# Patient Record
Sex: FEMALE | Race: WHITE | Employment: FULL TIME | URBAN - METROPOLITAN AREA
[De-identification: names, ages, dates, MRNs, and addresses within clinical notes are randomized per-mention and may not be internally consistent; named-entity substitution may affect disease eponyms.]

---

## 2017-12-13 ENCOUNTER — OFFICE VISIT (OUTPATIENT)
Dept: ORTHOPEDIC SURGERY | Age: 42
End: 2017-12-13

## 2017-12-13 VITALS
TEMPERATURE: 98.4 F | RESPIRATION RATE: 18 BRPM | SYSTOLIC BLOOD PRESSURE: 147 MMHG | WEIGHT: 249.4 LBS | BODY MASS INDEX: 35.71 KG/M2 | HEIGHT: 70 IN | OXYGEN SATURATION: 99 % | HEART RATE: 91 BPM | DIASTOLIC BLOOD PRESSURE: 72 MMHG

## 2017-12-13 DIAGNOSIS — M62.830 MUSCLE SPASM OF BACK: ICD-10-CM

## 2017-12-13 DIAGNOSIS — M47.816 LUMBAR FACET ARTHROPATHY: Primary | ICD-10-CM

## 2017-12-13 DIAGNOSIS — Z98.890 S/P LAMINECTOMY: ICD-10-CM

## 2017-12-13 DIAGNOSIS — G89.29 OTHER CHRONIC PAIN: ICD-10-CM

## 2017-12-13 RX ORDER — DULOXETIN HYDROCHLORIDE 60 MG/1
CAPSULE, DELAYED RELEASE ORAL
Refills: 0 | COMMUNITY
Start: 2017-11-28 | End: 2018-04-11 | Stop reason: ALTCHOICE

## 2017-12-13 RX ORDER — PHENTERMINE HYDROCHLORIDE 37.5 MG/1
TABLET ORAL
Refills: 0 | COMMUNITY
Start: 2017-10-08 | End: 2017-12-13 | Stop reason: ALTCHOICE

## 2017-12-13 RX ORDER — ESCITALOPRAM OXALATE 20 MG/1
20 TABLET ORAL DAILY
Refills: 0 | COMMUNITY
Start: 2017-11-22

## 2017-12-13 RX ORDER — ALPRAZOLAM 0.5 MG/1
0.5 TABLET ORAL
Refills: 0 | COMMUNITY
Start: 2017-11-30 | End: 2018-03-27 | Stop reason: ALTCHOICE

## 2017-12-13 NOTE — PROGRESS NOTES
[facility]  [address]aira Rodríguez[address] [place], [facility] [address]  Phone: [phone]  Fax: [phone]      ASSESSMENT   Diagnoses and all orders for this visit:    1. Lumbar facet arthropathy  -     SCHEDULE SURGERY    2. Muscle spasm of back  -     SCHEDULE SURGERY    3. Other chronic pain    4. S/P laminectomy         IMPRESSION AND PLAN:  Yanni Tomlin is a 38 y.o. female with history of lumbar pain. Pt reports that she experienced miserable lower back and buttock pain 2 weeks ago. She has been taking Aleve and Tylenol for pain and had a right L4-5 hemilaminotomy with Dr. Shazia Saenz on 03/09/2016. Pt has experienced significant relief with prednisone and steroid injections in the past.   More than 25 minutes was spent discussing pt's previous surgery, injections, medication including multiple NSAIDs, weight loss, exercise, RFA, and use of pain medication. 1) Pt was given information on lumbar arthritis exercises. 2) She was instructed to take no more than 2 tabs of Aleve daily. 3) Pt may try Voltaren 75 mg 1 tab BID in place of the Aleve. 4) I recommended the patient try gentle yoga. 5) I encouraged the patient try a low carb diet, She will discuss options with Dr. David Bernal prior to starting a diet. 6) I recommended the patient try Apsercaine. 7) She will discuss taking Cymbalta and Lexapro simultaneously with Dr. David Bernal. 8) Pt was scheduled for bilateral L4-5 and bilateral L5-S1 facet injection. 9) Ms. Felisa Mcghee has a reminder for a \"due or due soon\" health maintenance. I have asked that she contact her primary care provider, Vee Manley MD, for follow-up on this health maintenance. 10)  demonstrated consistency with prescribing.   11) Pt will follow-up in 6 weeks or sooner if needed. HISTORY OF PRESENT ILLNESS:  Yanni Tomlin is a 38 y.o. female with history of lumbar pain.  Pt reports that she experienced miserable lower back and buttock pain 2 weeks ago. She reports improvement and states that she is experiencing a \"good week. \" Her pain is worse when standing. Pt followed up with her PCP when she experienced no relief when taking 4 tabs Aleve and Tylenol for pain. She was told by her PCP that antiinflammatories are the best option. Pt has tried Naprosyn, Voltaren, and Relafen and reports the greatest relief with Voltaren. She experiences significant improvement when trying prednisone and steroid injections. Pt states that she has been followed by a rheumatologist and was tested for multiple types of arthritis with normal results. Pt complains of that she has gained weight and is unable to exercise due to the pain. 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 Of note, she had a right L4-5 hemilaminotomy with Dr. Stephen Conroy on 03/09/2016. Pt admits that her sister is a drug addict and states that she stole her last prescription of pain medication. She does not allow her sister in her house and had to lock up her purse over Thanksgiving so she would not steal her medications. Pt has been taking Percocet and reports that she had a reaction to sulfa when she was 21 y.o. She reports that she has taken drugs containing sulfa more recently without any side effects. Pt denies ever trying Celebrex and was placed on Cymbalta by Dr. Pedro Luis Gill. She admits that she is also taking Lexapro. Pt at this time desires to proceed with medication evaluation. Pain Scale: 3/10    PCP: Gal Frank MD       Past Medical History:   Diagnosis Date    Anger     Anxiety     Chronic pain     back pain    GERD (gastroesophageal reflux disease)     Psychiatric disorder     depression    Seizures (HCC)     Encephalitis, seizures only as a child, none since        Social History     Social History    Marital status:      Spouse name: N/A    Number of children: N/A    Years of education: N/A     Occupational History    Not on file.      Social History Main Topics    Smoking status: Current Every Day Smoker     Packs/day: 1.00     Years: 3.00     Last attempt to quit: 3/25/2005    Smokeless tobacco: Never Used    Alcohol use Yes      Comment: rarely    Drug use: No    Sexual activity: Not on file      Comment: Pregnancy test negative     Other Topics Concern    Not on file     Social History Narrative       Current Outpatient Prescriptions   Medication Sig Dispense Refill    ALPRAZolam (XANAX) 0.5 mg tablet Take 0.5 mg by mouth three (3) times daily as needed. 0    DULoxetine (CYMBALTA) 60 mg capsule take 1 capsule by mouth once daily  0    escitalopram oxalate (LEXAPRO) 20 mg tablet Take 20 mg by mouth daily. 0    oxyCODONE-acetaminophen (PERCOCET)  mg per tablet 1 po q pm prn severe pain 30 Tab 0    cyclobenzaprine (FLEXERIL) 10 mg tablet Take 1 Tab by mouth three (3) times daily as needed for Muscle Spasm(s). 60 Tab 0    Cholecalciferol, Vitamin D3, 1,000 unit cap Take  by mouth.  ascorbic acid (VITAMIN C) 500 mg tablet Take 500 mg by mouth daily.  naproxen sodium (ALEVE) 220 mg Cap Take 200 mg by mouth daily.  amitriptyline (ELAVIL) 10 mg tablet One po qhs x 1 week , then may increase to 2 tabs po qhs  Indications: NEUROPATHIC PAIN 60 Tab 1    pseudoephedrine (SUDAFED) 30 mg tablet Take 30 mg by mouth every four (4) hours as needed for Congestion. Allergies   Allergen Reactions    Betadine Surgi-Prep Rash    Neomycin Rash    Peroxyl [Hydrogen Peroxide] Rash    Sulfa Dyne Rash         REVIEW OF SYSTEMS    Constitutional: Negative for fever, chills, or weight change. Respiratory: Negative for cough or shortness of breath. Cardiovascular: Negative for chest pain or palpitations. Gastrointestinal: Negative for acid reflux, change in bowel habits, or constipation. Genitourinary: Negative for dysuria and flank pain. Musculoskeletal: Positive for lumbar pain. Skin: Negative for rash.    Neurological: Negative for headaches, dizziness, or numbness. Endo/Heme/Allergies: Negative for increased bruising. Psychiatric/Behavioral: Negative for difficulty with sleep. PHYSICAL EXAMINATION  Visit Vitals    /72    Pulse 91    Temp 98.4 °F (36.9 °C) (Oral)    Resp 18    Ht 5' 10\" (1.778 m)    Wt 249 lb 6.4 oz (113.1 kg)    SpO2 99%    BMI 35.79 kg/m2       Constitutional: Awake, alert, and in no acute distress. Neurological: 1+ symmetrical DTRs in the upper extremities. 1+ symmetrical DTRs in the lower extremities. Sensation to light touch is intact. Negative Tyson's sign bilaterally. Skin: warm, dry, and intact. Musculoskeletal: Tenderness to palpation in the lower lumbar region. No tenderness to palpation over the sacroiliac joints. Moderate pain with extension and axial loading. No pain with internal or external rotation of her hips. Negative straight leg raise bilaterally. Biceps  Triceps Deltoids Wrist Ext Wrist Flex Hand Intrin   Right +4/5 +4/5 +4/5 +4/5 +4/5 +4/5   Left +4/5 +4/5 +4/5 +4/5 +4/5 +4/5      Hip Flex  Quads Hamstrings Ankle DF EHL Ankle PF   Right +4/5 +4/5 +4/5 +4/5 +4/5 +4/5   Left +4/5 +4/5 +4/5 +4/5 +4/5 +4/5     IMAGING:    Lumbar spine MRI from 11/14/2015 was personally reviewed with the patient and demonstrated:    Results from [place] encounter on 11/14/15   MRI LUMB SPINE W WO CONT   Narrative Sagittal and axial multisequence MR images of lumbar spine were obtained without  and with 20 cc Magnevist gadolinium IV contrast.    History: Chronic back pain with now radiation to the right leg. Comparison: September 3, 2009    Straightening of lumbar lordosis with no significant spondylolisthesis. No  compression fracture or pathologic marrow signal. Conus medullaris ends at T12  and L1 with normal morphology and signal intensity. No abnormal enhancement. L1-L2: Mild posterior disc bulge. No central stenosis. No foraminal stenosis.     L2-L3: Also mild posterior disc bulge with no central stenosis. Mild facet  hypertrophy with small left facet joint effusion. No foraminal stenosis. Perineural cyst in the foramina. L3-L4: Posterior disc bulge with tiny central annular tear, flattening anterior  thecal sac with no central stenosis. Facet and ligamentous hypertrophy with no  foraminal stenosis. L4-L5: Developing central and right paracentral disc protrusion, displacing the  right L5 nerve root within the thecal sac. Facet ligamentous hypertrophy. Mild  central stenosis with canal measuring 9 mm. No foraminal stenosis or exiting L4  nerve root compression. L5-S1: Posterior disc bulge with small central disc protrusion. No central  stenosis. No compression or displacement of the S1 roots. Left laminotomy. No  arachnoiditis or significant thecal sac distortion. Mild facet hypertrophy. No  foraminal stenosis or exiting L5 root compression. Impression Impression:  1. Newly developed central and right paracentral disc protrusion at L4-L5, with  posterior displacement and compression of the right descending L5 nerve root  within the thecal sac. Facet hypertrophy with foraminal narrowing but no exiting  L4 root compression. 2. Additional findings as above. Written by Graham Muro, as dictated by Lynn Collins MD.  I, Dr. Lynn Collins confirm that all documentation is accurate.

## 2017-12-13 NOTE — MR AVS SNAPSHOT
Visit Information Date & Time Provider Department Dept. Phone Encounter #  
 12/13/2017  9:00 AM Sasha Evans, [facility] MAST  [id number] Follow-up Instructions Return in about 6 weeks (around 1/24/2018) for Injection follow up. Your Appointments 1/24/2018 10:30 AM  
Follow Up with Sasha Evans MD  
[facility] ONE [facility]) Appt Note: 6wk BLOCK FU; Lenoria Pointer 1/3/18  
 [address] [facility] [address]  
[phone]  
  
   
 [address] [facility] [address] Upcoming Health Maintenance Date Due Pneumococcal 19-64 Medium Risk (1 of 1 - PPSV23) 7/9/1992 DTaP/Tdap/Td series (1 - Tdap) 7/9/1994 PAP AKA CERVICAL CYTOLOGY 7/9/1994 Influenza Age 5 to Adult 8/1/2017 Allergies as of 12/13/2017  Review Complete On: 12/13/2017 By: Sasha Evans MD  
  
 Severity Noted Reaction Type Reactions Betadine Surgi-prep  09/11/2012    Rash Neomycin  09/11/2012    Rash Peroxyl [Hydrogen Peroxide]  09/11/2012    Rash  
 Sulfa Dyne  09/11/2012    Rash Current Immunizations  Never Reviewed No immunizations on file. Not reviewed this visit You Were Diagnosed With   
  
 Codes Comments Lumbar facet arthropathy    -  Primary ICD-10-CM: M12.88 ICD-9-CM: 721.3 Muscle spasm of back     ICD-10-CM: F89.140 ICD-9-CM: 724.8 Other chronic pain     ICD-10-CM: G89.29 ICD-9-CM: 338.29 S/P laminectomy     ICD-10-CM: R99.067 ICD-9-CM: V45.89 Vitals BP Pulse Temp Resp Height(growth percentile) Weight(growth percentile) 147/72 91 98.4 °F (36.9 °C) (Oral) 18 5' 10\" (1.778 m) 249 lb 6.4 oz (113.1 kg) SpO2 BMI OB Status Smoking Status 99% 35.79 kg/m2 IUD Current Every Day Smoker BMI and BSA Data Body Mass Index Body Surface Area  35.79 kg/m 2 2.36 m 2  
  
  
 Preferred Pharmacy Pharmacy Name Phone [facility] [address] [place] [address], [facility] [place] [address] [phone] Your Updated Medication List  
  
   
This list is accurate as of: 12/13/17 10:10 AM.  Always use your most recent med list.  
  
  
  
  
 ALEVE 220 mg Cap Generic drug:  naproxen sodium Take 200 mg by mouth daily. ALPRAZolam 0.5 mg tablet Commonly known as:  Mosetta Harp Take 0.5 mg by mouth three (3) times daily as needed. amitriptyline 10 mg tablet Commonly known as:  ELAVIL One po qhs x 1 week , then may increase to 2 tabs po qhs  Indications: NEUROPATHIC PAIN  
  
 cholecalciferol 1,000 unit Cap Commonly known as:  VITAMIN D3 Take  by mouth. cyclobenzaprine 10 mg tablet Commonly known as:  FLEXERIL Take 1 Tab by mouth three (3) times daily as needed for Muscle Spasm(s). DULoxetine 60 mg capsule Commonly known as:  CYMBALTA  
take 1 capsule by mouth once daily  
  
 escitalopram oxalate 20 mg tablet Commonly known as:  Shan Barrs Take 20 mg by mouth daily. oxyCODONE-acetaminophen  mg per tablet Commonly known as:  PERCOCET 1 po q pm prn severe pain  
  
 pseudoephedrine 30 mg tablet Commonly known as:  SUDAFED Take 30 mg by mouth every four (4) hours as needed for Congestion. VITAMIN C 500 mg tablet Generic drug:  ascorbic acid (vitamin C) Take 500 mg by mouth daily. We Performed the Following SCHEDULE SURGERY [LLY7345 Custom] Follow-up Instructions Return in about 6 weeks (around 1/24/2018) for Injection follow up. To-Do List   
 12/20/2017 4:15 PM  
  Appointment with KAILYN DILLON 2 at [address] [place] [address] ([phone]) OUTSIDE FILMS  - Any outside films related to the study being scheduled should be brought with you on the day of the exam.  If this cannot be done there may be a delay in the reading of the study.   MEDICATIONS  - Patient must bring a complete list of all medications currently taking to include prescriptions, over-the-counter meds, herbals, vitamins & any dietary supplements  GENERAL INSTRUCTIONS  - On the day of your exam do not use any bath powder, deodorant or lotions on the armpit area. -Tenderness of breasts may cause an increase of discomfort during procedure. If you are experiencing breast tenderness on the day of your appointment and would like to reschedule, please call [phone]. Patient Instructions Low Back Arthritis: Exercises Your Care Instructions Here are some examples of typical rehabilitation exercises for your condition. Start each exercise slowly. Ease off the exercise if you start to have pain. Your doctor or physical therapist will tell you when you can start these exercises and which ones will work best for you. When you are not being active, find a comfortable position for rest. Some people are comfortable on the floor or a medium-firm bed with a small pillow under their head and another under their knees. Some people prefer to lie on their side with a pillow between their knees. Don't stay in one position for too long. Take short walks (10 to 20 minutes) every 2 to 3 hours. Avoid slopes, hills, and stairs until you feel better. Walk only distances you can manage without pain, especially leg pain. How to do the exercises Pelvic tilt 1. Lie on your back with your knees bent. 2. \"Brace\" your stomach-tighten your muscles by pulling in and imagining your belly button moving toward your spine. 3. Press your lower back into the floor. You should feel your hips and pelvis rock back. 4. Hold for 6 seconds while breathing smoothly. 5. Relax and allow your pelvis and hips to rock forward. 6. Repeat 8 to 12 times. Back stretches 1. Get down on your hands and knees on the floor. 2. Relax your head and allow it to droop.  Round your back up toward the ceiling until you feel a nice stretch in your upper, middle, and lower back. Hold this stretch for as long as it feels comfortable, or about 15 to 30 seconds. 3. Return to the starting position with a flat back while you are on your hands and knees. 4. Let your back sway by pressing your stomach toward the floor. Lift your buttocks toward the ceiling. 5. Hold this position for 15 to 30 seconds. 6. Repeat 2 to 4 times. Follow-up care is a key part of your treatment and safety. Be sure to make and go to all appointments, and call your doctor if you are having problems. It's also a good idea to know your test results and keep a list of the medicines you take. Where can you learn more? Go to http://mona-rajni.info/. Enter G620 in the search box to learn more about \"Low Back Arthritis: Exercises. \" Current as of: March 21, 2017 Content Version: 11.4 © 8292-0552 [employer]. Care instructions adapted under license by [employer] (which disclaims liability or warranty for this information). If you have questions about a medical condition or this instruction, always ask your healthcare professional. [address] any warranty or liability for your use of this information. Introducing [facility]! Aravind Hauser introduces [employer] patient portal. Now you can access parts of your medical record, email your doctor's office, and request medication refills online. 1. In your internet browser, go to [email or web address] 2. Click on the First Time User? Click Here link in the Sign In box. You will see the New Member Sign Up page. 3. Enter your [employer] Access Code exactly as it appears below. You will not need to use this code after youve completed the sign-up process. If you do not sign up before the expiration date, you must request a new code. · [employer] Access Code: [email or web address] Expires: 2/26/2018  8:38 AM 
 
 4. Enter the last four digits of your Social Security Number (xxxx) and Date of Birth (mm/dd/yyyy) as indicated and click Submit. You will be taken to the next sign-up page. 5. Create a [employer] ID. This will be your [employer] login ID and cannot be changed, so think of one that is secure and easy to remember. 6. Create a [employer] password. You can change your password at any time. 7. Enter your Password Reset Question and Answer. This can be used at a later time if you forget your password. 8. Enter your e-mail address. You will receive e-mail notification when new information is available in [address] 9. Click Sign Up. You can now view and download portions of your medical record. 10. Click the Download Summary menu link to download a portable copy of your medical information. If you have questions, please visit the Frequently Asked Questions section of the [employer] website. Remember, [employer] is NOT to be used for urgent needs. For medical emergencies, dial 911. Now available from your iPhone and Android! Please provide this summary of care documentation to your next provider. Your primary care clinician is listed as Melanie Sharma. If you have any questions after today's visit, please call [phone].

## 2017-12-13 NOTE — PATIENT INSTRUCTIONS

## 2017-12-13 NOTE — LETTER
[facility] for [facility] Uriah Hester Fax to [phone]  Telephone: [phone] To be completed by Physician Office: 
 
Date: 2017    Requested by: Tania Ramirez Phone No: [phone]   Fax No:  [phone] Surgery Date: 2018  Arrival Time: 12:00   Injection Time: 1:30 Provider: Dea Allen     
 
CPT CODE*  Procedure 41094,51 FACET JOINT ALISTAIR L4/5  
30893,92 FACET JOINT ALISTAIR L5/S1 *CPT code is required for ALL procedures. Patient Information: 
 
Name: [facility]: [id number]  : 1975   Male/Female: female Home Phone No: [phone] (home) [phone] (work) Primary Insurance: [employer] Number: [id number] AUTH#                             SUBMITTED 17 ICD10 code(s): M12.88; M62.830    Diagnosis:  LUMBAR FACET ARTHROPATHY; MUSCLE SPASM OF BACK Diabetic/finger stick to be done BS level must be <200 mg/dl Anesthesia Type Local: Valium 10 mg PO 30 minutes prior to procedure

## 2017-12-19 ENCOUNTER — TELEPHONE (OUTPATIENT)
Dept: ORTHOPEDIC SURGERY | Age: 42
End: 2017-12-19

## 2017-12-19 NOTE — TELEPHONE ENCOUNTER
Per the note,  Pt may try Voltaren 75 mg 1 tab BID in place of the Aleve. Ok to send this #60, 0 RF. Please make sure patient knows to take this in placed of the Aleve.

## 2017-12-19 NOTE — TELEPHONE ENCOUNTER
Patient stated that Dr. Keven Patiño was suppose to put in a prescription for that patient since her last visit 12/13/17. Patient cant remember thet name of the prescription.  Please advise patient at [phone] (work) before 2 after 2 [phone](home)

## 2017-12-20 ENCOUNTER — HOSPITAL ENCOUNTER (OUTPATIENT)
Dept: MAMMOGRAPHY | Age: 42
Discharge: HOME | End: 2017-12-20
Attending: INTERNAL MEDICINE
Payer: COMMERCIAL

## 2017-12-20 DIAGNOSIS — Z12.31 VISIT FOR SCREENING MAMMOGRAM: ICD-10-CM

## 2017-12-20 PROCEDURE — 77067 SCR MAMMO BI INCL CAD: CPT

## 2017-12-20 RX ORDER — DICLOFENAC SODIUM 75 MG/1
75 TABLET, DELAYED RELEASE ORAL 2 TIMES DAILY WITH MEALS
Qty: 60 TAB | Refills: 0 | Status: SHIPPED | OUTPATIENT
Start: 2017-12-20 | End: 2018-01-24 | Stop reason: SDUPTHER

## 2017-12-20 RX ORDER — DICLOFENAC SODIUM 75 MG/1
75 TABLET, DELAYED RELEASE ORAL 2 TIMES DAILY WITH MEALS
Qty: 60 TAB | Refills: 0 | Status: SHIPPED | OUTPATIENT
Start: 2017-12-20 | End: 2018-03-21 | Stop reason: SDUPTHER

## 2017-12-20 NOTE — TELEPHONE ENCOUNTER
Rx for Diclofenac called In and patient was notified and to take this in place of the Aleve. Patient verbalized understanding . No further action needed.

## 2018-01-03 ENCOUNTER — APPOINTMENT (OUTPATIENT)
Dept: GENERAL RADIOLOGY | Age: 43
End: 2018-01-03
Attending: PHYSICAL MEDICINE & REHABILITATION
Payer: COMMERCIAL

## 2018-01-03 ENCOUNTER — HOSPITAL ENCOUNTER (OUTPATIENT)
Age: 43
Setting detail: OUTPATIENT SURGERY
Discharge: HOME | End: 2018-01-03
Attending: PHYSICAL MEDICINE & REHABILITATION | Admitting: PHYSICAL MEDICINE & REHABILITATION
Payer: COMMERCIAL

## 2018-01-03 VITALS
BODY MASS INDEX: 35.65 KG/M2 | DIASTOLIC BLOOD PRESSURE: 69 MMHG | RESPIRATION RATE: 20 BRPM | OXYGEN SATURATION: 99 % | WEIGHT: 249 LBS | HEART RATE: 71 BPM | TEMPERATURE: 98.5 F | HEIGHT: 70 IN | SYSTOLIC BLOOD PRESSURE: 137 MMHG

## 2018-01-03 PROCEDURE — 74011636320 HC RX REV CODE- 636/320: Performed by: PHYSICAL MEDICINE & REHABILITATION

## 2018-01-03 PROCEDURE — 74011250636 HC RX REV CODE- 250/636

## 2018-01-03 PROCEDURE — 74011000250 HC RX REV CODE- 250

## 2018-01-03 PROCEDURE — 74011250636 HC RX REV CODE- 250/636: Performed by: PHYSICAL MEDICINE & REHABILITATION

## 2018-01-03 PROCEDURE — 76010000009 HC PAIN MGT 0 TO 30 MIN PROC: Performed by: PHYSICAL MEDICINE & REHABILITATION

## 2018-01-03 RX ORDER — DIAZEPAM 5 MG/1
5-20 TABLET ORAL ONCE
Status: DISCONTINUED | OUTPATIENT
Start: 2018-01-03 | End: 2018-01-03 | Stop reason: HOSPADM

## 2018-01-03 RX ORDER — LIDOCAINE HYDROCHLORIDE 10 MG/ML
INJECTION, SOLUTION EPIDURAL; INFILTRATION; INTRACAUDAL; PERINEURAL AS NEEDED
Status: DISCONTINUED | OUTPATIENT
Start: 2018-01-03 | End: 2018-01-03 | Stop reason: HOSPADM

## 2018-01-03 RX ORDER — DEXAMETHASONE SODIUM PHOSPHATE 100 MG/10ML
INJECTION INTRAMUSCULAR; INTRAVENOUS AS NEEDED
Status: DISCONTINUED | OUTPATIENT
Start: 2018-01-03 | End: 2018-01-03 | Stop reason: HOSPADM

## 2018-01-03 RX ORDER — BUPROPION HYDROCHLORIDE 300 MG/1
300 TABLET ORAL
COMMUNITY

## 2018-01-03 NOTE — PROCEDURES
Facet Joint Block Procedure Note    Patient Name: Hakan Najera    Date of Procedure: January 3, 2018    Preoperative Diagnosis: Facet arthropathy, lumbar [M12.88]  Back muscle spasm [M62.830]    Post Operative Diagnosis:Facet arthropathy, lumbar [M12.88]  Back muscle spasm [M62.830]     Procedure:  bilateral  L5-S1 Facet Joint Block  bilateral  L4-L5 Facet Joint Block    Consent: Informed consent was obtained prior to the procedure. The patient was given the opportunity to ask questions regarding the procedure and its associated risks. In addition to the potential risks associated with the procedure itself, the patient was informed both verbally and in writing of potential side effects of the use of glucocorticoids. The patient appeared to comprehend the informed consent and desired to have the procedure perfored. Procedure: The patient was placed in the prone position on the flouroscopy table and the back was prepped and draped in the usual sterile manner. At each blocked level, the exact location of the facet joint was identified with flouroscopy, and after local Lidocaine 1% injection, a #22 gauge spinal needle was then advanced toward the joint. A total of 30 mg of preservative free Dexamethasone and 5  Facet Joint Block Procedure Note    Patient Name: Hakan Najera    Date of Procedure: January 3, 2018    Preoperative Diagnosis: Facet arthropathy, lumbar [M12.88]  Back muscle spasm [M62.830]    Post Operative Diagnosis:Facet arthropathy, lumbar [M12.88]  Back muscle spasm [M62.830]     Procedure:  bilateral  L4-L5 Facet Joint Block  bilateral  L5-S1 Facet Joint Block      Consent: Informed consent was obtained prior to the procedure. The patient was given the opportunity to ask questions regarding the procedure and its associated risks.  In addition to the potential risks associated with the procedure itself, the patient was informed both verbally and in writing of potential side effects of the use of glucocorticoids. The patient appeared to comprehend the informed consent and desired to have the procedure performed. Procedure: The patient was placed in the prone position on the flouroscopy table and the back was prepped and draped in the usual sterile manner. At each blocked level, the exact location of the facet joint was identified with flouroscopy, and after local Lidocaine 1% injection, a #22 gauge spinal needle was then advanced toward the joint. A total of 30 mg of preservative free Dexamethasone and 5 cc of Lidocaine was injected around and equally divided among all of the sites. The patient tolerated the procedure well. The injection area was cleaned and bandaids applied. No excessive bleeding was noted. Patient dressed and was discharged to home with instructions. Naomi Camacho MD  January 3, 2018 cc of Lidocaine was injected around and equally divided among all of the sites. The patient tolerated the procedure well. The injection area was cleaned and bandaids applied. No excessive bleeding was noted. Patient dressed and was discharged to home with instructions. Naomi Camacho MD  January 3, 2018     This is an addendum to state that one set of injections was performed -- bilateral L4/5, bilateral L5/S1 facet injections were performed; The note was entered twice incorrectly but only one set of injections was done.

## 2018-01-03 NOTE — H&P
Date of Surgery Update:  Charles Herr was seen and examined. History and physical has been reviewed. The patient has been examined. There have been no significant clinical changes since the completion of the last office visit.       Signed By: Duane Ensign, MD     January 3, 2018 1:51 PM

## 2018-01-03 NOTE — DISCHARGE INSTRUCTIONS
[facility]   ([facility])  Dr. April Thao, Dr. Jaime Lopes, Dr. Ofelia Ruffin not drive a car, operate heavy machinery or dangerous equipment for 24 hours. * Activity as tolerated; rest for the remainder of the day. * Resume pre-block medications including those for your family doctor. * Do not drink alcoholic beverages for 24 hours. Alcohol and the medications you have received may interact and cause an adverse reaction. * You may feel better this evening and worse tomorrow, as the numbing medications wears off and the steroid has yet to begin to work. After 48 hrs the steroid should begin to release bringing you relief. * You may shower this evening and remove any bandages. * Avoid hot tubs and heating pads for 24 hours. You may use cold packs on the procedure site as tolerated for the first 24 hours. * If a headache develops, drink plenty of fluids and rest.  Take over the counter medications for headache if needed. If the headache continues longer than 24 hours, call MD at the [address] [place] [address]. [phone]    * Continue taking pain medications as needed. * You may resume your regular diet if tolerated. Otherwise, start with sips of water and advance slowly. * If Diabetic: check your blood sugar three times a day for the next 3 days. If your sugar is greater than 300 call your family doctor. If your sugar is greater than 400, have someone transport you to the nearest Emergency Room. * If you experience any of the following problems, Please Call the [address] [place] [address] at [phone].         * Shortness of Breath    * Fever of 101 or higher    * Nausea / Vomiting    * Severe Headache    * Weakness or numbness in arms or legs that is not      resolving    * Prolonged increase in pain greater than 4 days      DISCHARGE SUMMARY from Nurse      PATIENT INSTRUCTIONS:    After oral sedation, for 24 hours or while taking prescription Narcotics:  · Limit your activities  · Do not drive and operate hazardous machinery  · Do not make important personal or business decisions  · Do  not drink alcoholic beverages  · If you have not urinated within 8 hours after discharge, please contact your surgeon on call. Report the following to your surgeon:  · Excessive pain, swelling, redness or odor of or around the surgical area  · Temperature over 101  · Nausea and vomiting lasting longer than 4 hours or if unable to take medications  · Any signs of decreased circulation or nerve impairment to extremity: change in color, persistent  numbness, tingling, coldness or increase pain  · Any questions            What to do at Home:  Recommended activity: Activity as tolerated, NO DRIVING FOR 12 Hours post injection          *  Please give a list of your current medications to your Primary Care Provider. *  Please update this list whenever your medications are discontinued, doses are      changed, or new medications (including over-the-counter products) are added. *  Please carry medication information at all times in case of emergency situations. These are general instructions for a healthy lifestyle:    No smoking/ No tobacco products/ Avoid exposure to second hand smoke    Surgeon General's Warning:  Quitting smoking now greatly reduces serious risk to your health. Obesity, smoking, and sedentary lifestyle greatly increases your risk for illness    A healthy diet, regular physical exercise & weight monitoring are important for maintaining a healthy lifestyle    You may be retaining fluid if you have a history of heart failure or if you experience any of the following symptoms:  Weight gain of 3 pounds or more overnight or 5 pounds in a week, increased swelling in our hands or feet or shortness of breath while lying flat in bed.   Please call your doctor as soon as you notice any of these symptoms; do not wait until your next office visit. Recognize signs and symptoms of STROKE:    F-face looks uneven    A-arms unable to move or move unevenly    S-speech slurred or non-existent    T-time-call 911 as soon as signs and symptoms begin-DO NOT go       Back to bed or wait to see if you get better-TIME IS BRAIN. MyChart Activation    Thank you for requesting access to [employer]. Please follow the instructions below to securely access and download your online medical record. Vocation allows you to send messages to your doctor, view your test results, renew your prescriptions, schedule appointments, and more. How Do I Sign Up? 1. In your internet browser, go to [email or web address]  2. Click on the First Time User? Click Here link in the Sign In box. You will be redirect to the New Member Sign Up page. 3. Enter your [employer] Access Code exactly as it appears below. You will not need to use this code after youve completed the sign-up process. If you do not sign up before the expiration date, you must request a new code. V[employer] Access Code: [email or web address]  Expires: 2018  8:38 AM (This is the date your Vocation access code will )    4. Enter the last four digits of your Social Security Number (xxxx) and Date of Birth (mm/dd/yyyy) as indicated and click Submit. You will be taken to the next sign-up page. 5. Create a Vocation ID. This will be your Vocation login ID and cannot be changed, so think of one that is secure and easy to remember. 6. Create a Vocation password. You can change your password at any time. 7. Enter your Password Reset Question and Answer. This can be used at a later time if you forget your password. 8. Enter your e-mail address. You will receive e-mail notification when new information is available in [address] 9. Click Sign Up. You can now view and download portions of your medical record.   10. Click the Download Summary menu link to download a portable copy of your medical information. Additional Information    If you have questions, please visit the Frequently Asked Questions section of the [employer] website at [email or web address] Remember, [employer] is NOT to be used for urgent needs. For medical emergencies, dial 911.

## 2018-01-24 ENCOUNTER — OFFICE VISIT (OUTPATIENT)
Dept: ORTHOPEDIC SURGERY | Age: 43
End: 2018-01-24

## 2018-01-24 VITALS
HEIGHT: 70 IN | TEMPERATURE: 98.3 F | BODY MASS INDEX: 36.31 KG/M2 | SYSTOLIC BLOOD PRESSURE: 138 MMHG | OXYGEN SATURATION: 99 % | DIASTOLIC BLOOD PRESSURE: 70 MMHG | WEIGHT: 253.6 LBS | RESPIRATION RATE: 18 BRPM | HEART RATE: 76 BPM

## 2018-01-24 DIAGNOSIS — M47.816 FACET ARTHRITIS OF LUMBAR REGION: Primary | ICD-10-CM

## 2018-01-24 DIAGNOSIS — M62.830 MUSCLE SPASM OF BACK: ICD-10-CM

## 2018-01-24 DIAGNOSIS — M51.36 DDD (DEGENERATIVE DISC DISEASE), LUMBAR: ICD-10-CM

## 2018-01-24 RX ORDER — DICLOFENAC SODIUM 75 MG/1
75 TABLET, DELAYED RELEASE ORAL 2 TIMES DAILY WITH MEALS
Qty: 180 TAB | Refills: 1 | Status: SHIPPED | OUTPATIENT
Start: 2018-01-24

## 2018-01-24 NOTE — MR AVS SNAPSHOT
[address] [place] [address] [address] [place] [address] 
[phone] Patient: Yandel Beavers 
MRN: [id number] Visit Information Date & Time Provider Department Dept. Phone Encounter #  
 1/24/2018 10:30 AM Janes Barroso MD [facility] [phone] [id number] Follow-up Instructions Return in about 2 months (around 3/24/2018) for follow up. Upcoming Health Maintenance Date Due Pneumococcal 19-64 Medium Risk (1 of 1 - PPSV23) 7/9/1992 DTaP/Tdap/Td series (1 - Tdap) 7/9/1994 PAP AKA CERVICAL CYTOLOGY 7/9/1994 Influenza Age 5 to Adult 8/1/2017 Allergies as of 1/24/2018  Review Complete On: 1/24/2018 By: Janes Barroso MD  
  
 Severity Noted Reaction Type Reactions Betadine Surgi-prep  09/11/2012    Rash Neomycin  09/11/2012    Rash Peroxyl [Hydrogen Peroxide]  09/11/2012    Rash  
 Sulfa Dyne  09/11/2012    Rash Current Immunizations  Never Reviewed No immunizations on file. Not reviewed this visit You Were Diagnosed With   
  
 Codes Comments Facet arthritis of lumbar region [facility])    -  Primary ICD-10-CM: M46.96 
ICD-9-CM: 721.3 Muscle spasm of back     ICD-10-CM: V43.512 ICD-9-CM: 724.8   
 DDD (degenerative disc disease), lumbar     ICD-10-CM: M51.36 
ICD-9-CM: 722.52 Vitals BP Pulse Temp Resp Height(growth percentile) Weight(growth percentile) 138/70 76 98.3 °F (36.8 °C) (Oral) 18 5' 10\" (1.778 m) 253 lb 9.6 oz (115 kg) SpO2 BMI OB Status Smoking Status 99% 36.39 kg/m2 IUD Current Every Day Smoker BMI and BSA Data Body Mass Index Body Surface Area  
 36.39 kg/m 2 2.38 m 2 Preferred Pharmacy Pharmacy Name Phone [facility] [address] [place] [address] [phone] Your Updated Medication List  
  
   
 This list is accurate as of: 1/24/18 11:17 AM.  Always use your most recent med list.  
  
  
  
  
 ALEVE 220 mg Cap Generic drug:  naproxen sodium Take 200 mg by mouth daily. ALPRAZolam 0.5 mg tablet Commonly known as:  [place] Take 0.5 mg by mouth three (3) times daily as needed. amitriptyline 10 mg tablet Commonly known as:  ELAVIL One po qhs x 1 week , then may increase to 2 tabs po qhs  Indications: NEUROPATHIC PAIN  
  
 cholecalciferol 1,000 unit Cap Commonly known as:  VITAMIN D3 Take  by mouth. cyclobenzaprine 10 mg tablet Commonly known as:  FLEXERIL Take 1 Tab by mouth three (3) times daily as needed for Muscle Spasm(s). * diclofenac EC 75 mg EC tablet Commonly known as:  VOLTAREN Take 1 Tab by mouth two (2) times daily (with meals). * diclofenac EC 75 mg EC tablet Commonly known as:  VOLTAREN Take 1 Tab by mouth two (2) times daily (with meals). DULoxetine 60 mg capsule Commonly known as:  CYMBALTA  
take 1 capsule by mouth once daily  
  
 escitalopram oxalate 20 mg tablet Commonly known as:  Ambika Code Take 20 mg by mouth daily. oxyCODONE-acetaminophen  mg per tablet Commonly known as:  PERCOCET 1 po q pm prn severe pain  
  
 pseudoephedrine 30 mg tablet Commonly known as:  SUDAFED Take 30 mg by mouth every four (4) hours as needed for Congestion. VITAMIN C 500 mg tablet Generic drug:  ascorbic acid (vitamin C) Take 500 mg by mouth daily. WELLBUTRIN  mg XL tablet Generic drug:  buPROPion XL Take 300 mg by mouth every morning. * Notice: This list has 2 medication(s) that are the same as other medications prescribed for you. Read the directions carefully, and ask your doctor or other care provider to review them with you. Prescriptions Sent to Pharmacy Refills  
 diclofenac EC (VOLTAREN) 75 mg EC tablet 1 Sig: Take 1 Tab by mouth two (2) times daily (with meals). Class: Normal  
 Pharmacy: [id number] [address] #: [phone] Route: Oral  
  
Follow-up Instructions Return in about 2 months (around 3/24/2018) for follow up. Patient Instructions Low Back Arthritis: Exercises Your Care Instructions Here are some examples of typical rehabilitation exercises for your condition. Start each exercise slowly. Ease off the exercise if you start to have pain. Your doctor or physical therapist will tell you when you can start these exercises and which ones will work best for you. When you are not being active, find a comfortable position for rest. Some people are comfortable on the floor or a medium-firm bed with a small pillow under their head and another under their knees. Some people prefer to lie on their side with a pillow between their knees. Don't stay in one position for too long. Take short walks (10 to 20 minutes) every 2 to 3 hours. Avoid slopes, hills, and stairs until you feel better. Walk only distances you can manage without pain, especially leg pain. How to do the exercises Pelvic tilt 1. Lie on your back with your knees bent. 2. \"Brace\" your stomach-tighten your muscles by pulling in and imagining your belly button moving toward your spine. 3. Press your lower back into the floor. You should feel your hips and pelvis rock back. 4. Hold for 6 seconds while breathing smoothly. 5. Relax and allow your pelvis and hips to rock forward. 6. Repeat 8 to 12 times. Back stretches 1. Get down on your hands and knees on the floor. 2. Relax your head and allow it to droop. Round your back up toward the ceiling until you feel a nice stretch in your upper, middle, and lower back. Hold this stretch for as long as it feels comfortable, or about 15 to 30 seconds. 3. Return to the starting position with a flat back while you are on your hands and knees. 4. Let your back sway by pressing your stomach toward the floor. Lift your buttocks toward the ceiling. 5. Hold this position for 15 to 30 seconds. 6. Repeat 2 to 4 times. Follow-up care is a key part of your treatment and safety. Be sure to make and go to all appointments, and call your doctor if you are having problems. It's also a good idea to know your test results and keep a list of the medicines you take. Where can you learn more? Go to http://mona-rajni.info/. Enter Q819 in the search box to learn more about \"Low Back Arthritis: Exercises. \" Current as of: March 21, 2017 Content Version: 11.4 © 8411-1610 [employer]. Care instructions adapted under license by [employer] (which disclaims liability or warranty for this information). If you have questions about a medical condition or this instruction, always ask your healthcare professional. [address] any warranty or liability for your use of this information. Introducing [facility]! Rusty Reed introduces [employer] patient portal. Now you can access parts of your medical record, email your doctor's office, and request medication refills online. 1. In your internet browser, go to [email or web address] 2. Click on the First Time User? Click Here link in the Sign In box. You will see the New Member Sign Up page. 3. Enter your [employer] Access Code exactly as it appears below. You will not need to use this code after youve completed the sign-up process. If you do not sign up before the expiration date, you must request a new code. · [employer] Access Code: [email or web address] Expires: 2/26/2018  8:38 AM 
 
4. Enter the last four digits of your Social Security Number (xxxx) and Date of Birth (mm/dd/yyyy) as indicated and click Submit. You will be taken to the next sign-up page. 5. Create a [employer] ID.  This will be your [employer] login ID and cannot be changed, so think of one that is secure and easy to remember. 6. Create a AttorneyFee password. You can change your password at any time. 7. Enter your Password Reset Question and Answer. This can be used at a later time if you forget your password. 8. Enter your e-mail address. You will receive e-mail notification when new information is available in [address] 9. Click Sign Up. You can now view and download portions of your medical record. 10. Click the Download Summary menu link to download a portable copy of your medical information. If you have questions, please visit the Frequently Asked Questions section of the AttorneyFee website. Remember, Attorney[employer]ee is NOT to be used for urgent needs. For medical emergencies, dial 911. Now available from your iPhone and Android! Please provide this summary of care documentation to your next provider. Your primary care clinician is listed as Betsy Elizondo. If you have any questions after today's visit, please call [phone].

## 2018-01-24 NOTE — PROGRESS NOTES
[facility]  [address] [place], [address]  Phone: [phone]  Fax: [phone]      ASSESSMENT   Diagnoses and all orders for this visit:    1. Facet arthritis of lumbar region ([address]  -     diclofenac EC (VOLTAREN) 75 mg EC tablet; Take 1 Tab by mouth two (2) times daily (with meals). 2. Muscle spasm of back    3. DDD (degenerative disc disease), lumbar         IMPRESSION AND PLAN:  Merline Revel is a 38 y.o. female with history of lumbar pain. She tried a bilateral L5-S1 and bilateral L4-5 facet injection on 01/03/2018 with substantial relief and improvement in her range of motion. Pt was using Cymbalta and states that she recently switched to Wellbutrin with significant improvement in her mood swings. 1) Pt was given information on lumbar arthritis exercises. 2) I encouraged the patient to lose weight. 3) I recommended the patient perform weight resistance exercises with stretch bands. 4) I encouraged the patient to exercise regularly, 30 minutes a day, 5 days a week. 5) I recommended the patient change positions regularly. 6) She received a refill of Voltaren 75 mg 1 tab BID with meals. 7) Ms. Esthela Martinez has a reminder for a \"due or due soon\" health maintenance. I have asked that she contact her primary care provider, Valerie Parnell MD, for follow-up on this health maintenance. 8)  demonstrated consistency with prescribing. 9) Pt will follow-up in 2 months or sooner if needed. HISTORY OF PRESENT ILLNESS:  Merline Revel is a 38 y.o. female with history of lumbar pain. She tried a bilateral L5-S1 and bilateral L4-5 facet injection on 01/03/2018 with substantial relief. Pt reports that she has also experienced improvement in her range of motion since the injections. She continues to experience occasional numbness in the left lateral foot that extends to the midcalf.  Pt admits that her left foot gives out on her when walking prolonged distances. Of note, she had a right L4-5 hemilaminotomy with Dr. Ann Little on 03/09/2016. She was using Cymbalta and states that she recently switched to Wellbutrin with significant improvement in her mood swings. Pt is also on Lexapro. She is trying to lose weight and is currently on a diet. Her goal is to lose 50 lbs. Pt is able to ride her bicycle and swim without pain. Pt at this time desires to current care. Pain Scale: 1/10    PCP: Valerie Parnell MD       Past Medical History:   Diagnosis Date    Anger     Anxiety     Chronic pain     back pain    GERD (gastroesophageal reflux disease)     Psychiatric disorder     depression    Seizures (HCC)     Encephalitis, seizures only as a child, none since        Social History     Social History    Marital status:      Spouse name: N/A    Number of children: N/A    Years of education: N/A     Occupational History    Not on file. Social History Main Topics    Smoking status: Current Every Day Smoker     Packs/day: 1.00     Years: 3.00     Last attempt to quit: 3/25/2005    Smokeless tobacco: Never Used    Alcohol use Yes      Comment: rarely    Drug use: No    Sexual activity: Not on file      Comment: Pregnancy test negative     Other Topics Concern    Not on file     Social History Narrative       Current Outpatient Prescriptions   Medication Sig Dispense Refill    diclofenac EC (VOLTAREN) 75 mg EC tablet Take 1 Tab by mouth two (2) times daily (with meals). 180 Tab 1    buPROPion XL (WELLBUTRIN XL) 300 mg XL tablet Take 300 mg by mouth every morning.  diclofenac EC (VOLTAREN) 75 mg EC tablet Take 1 Tab by mouth two (2) times daily (with meals). 60 Tab 0    ALPRAZolam (XANAX) 0.5 mg tablet Take 0.5 mg by mouth three (3) times daily as needed. 0    escitalopram oxalate (LEXAPRO) 20 mg tablet Take 20 mg by mouth daily. 0    Cholecalciferol, Vitamin D3, 1,000 unit cap Take  by mouth.       pseudoephedrine (SUDAFED) 30 mg tablet Take 30 mg by mouth every four (4) hours as needed for Congestion.  ascorbic acid (VITAMIN C) 500 mg tablet Take 500 mg by mouth daily.  DULoxetine (CYMBALTA) 60 mg capsule take 1 capsule by mouth once daily  0    oxyCODONE-acetaminophen (PERCOCET)  mg per tablet 1 po q pm prn severe pain 30 Tab 0    cyclobenzaprine (FLEXERIL) 10 mg tablet Take 1 Tab by mouth three (3) times daily as needed for Muscle Spasm(s). 60 Tab 0    amitriptyline (ELAVIL) 10 mg tablet One po qhs x 1 week , then may increase to 2 tabs po qhs  Indications: NEUROPATHIC PAIN 60 Tab 1    naproxen sodium (ALEVE) 220 mg Cap Take 200 mg by mouth daily. Allergies   Allergen Reactions    Betadine Surgi-Prep Rash    Neomycin Rash    Peroxyl [Hydrogen Peroxide] Rash    Sulfa Dyne Rash         REVIEW OF SYSTEMS    Constitutional: Negative for fever, chills, or weight change. Respiratory: Negative for cough or shortness of breath. Cardiovascular: Negative for chest pain or palpitations. Gastrointestinal: Negative for acid reflux, change in bowel habits, or constipation. Genitourinary: Negative for dysuria and flank pain. Musculoskeletal: Positive for lumbar pain. Skin: Negative for rash. Neurological: Negative for headaches or dizziness. Positive for numbness in the left lateral foot extending to the mid calf. Endo/Heme/Allergies: Negative for increased bruising. Psychiatric/Behavioral: Negative for difficulty with sleep. PHYSICAL EXAMINATION  Visit Vitals    /70    Pulse 76    Temp 98.3 °F (36.8 °C) (Oral)    Resp 18    Ht 5' 10\" (1.778 m)    Wt 253 lb 9.6 oz (115 kg)    SpO2 99%    BMI 36.39 kg/m2       Constitutional: Awake, alert, and in no acute distress. Neurological: 1+ symmetrical DTRs in the lower extremities. Sensation to light touch is intact. Skin: warm, dry, and intact. Musculoskeletal: Tenderness to palpation in the lower lumbar region.  Moderate pain with extension and axial loading. No pain with internal or external rotation of her hips. Negative straight leg raise bilaterally. Hip Flex  Quads Hamstrings Ankle DF EHL Ankle PF   Right +4/5 +4/5 +4/5 +4/5 +4/5 +4/5   Left +4/5 +4/5 +4/5 +4/5 +4/5 +4/5     IMAGING:    Lumbar spine MRI from 11/14/2015 was personally reviewed with the patient and demonstrated:    Results from [place] encounter on 11/14/15   MRI LUMB SPINE W WO CONT   Narrative Sagittal and axial multisequence MR images of lumbar spine were obtained without  and with 20 cc Magnevist gadolinium IV contrast.    History: Chronic back pain with now radiation to the right leg. Comparison: September 3, 2009    Straightening of lumbar lordosis with no significant spondylolisthesis. No  compression fracture or pathologic marrow signal. Conus medullaris ends at T12  and L1 with normal morphology and signal intensity. No abnormal enhancement. L1-L2: Mild posterior disc bulge. No central stenosis. No foraminal stenosis. L2-L3: Also mild posterior disc bulge with no central stenosis. Mild facet  hypertrophy with small left facet joint effusion. No foraminal stenosis. Perineural cyst in the foramina. L3-L4: Posterior disc bulge with tiny central annular tear, flattening anterior  thecal sac with no central stenosis. Facet and ligamentous hypertrophy with no  foraminal stenosis. L4-L5: Developing central and right paracentral disc protrusion, displacing the  right L5 nerve root within the thecal sac. Facet ligamentous hypertrophy. Mild  central stenosis with canal measuring 9 mm. No foraminal stenosis or exiting L4  nerve root compression. L5-S1: Posterior disc bulge with small central disc protrusion. No central  stenosis. No compression or displacement of the S1 roots. Left laminotomy. No  arachnoiditis or significant thecal sac distortion. Mild facet hypertrophy. No  foraminal stenosis or exiting L5 root compression. Impression Impression:  1. Newly developed central and right paracentral disc protrusion at L4-L5, with  posterior displacement and compression of the right descending L5 nerve root  within the thecal sac. Facet hypertrophy with foraminal narrowing but no exiting  L4 root compression. 2. Additional findings as above. Written by Felicia Ghosh, as dictated by Dakotah Bansal MD.  I, Dr. Dakotah Bansal confirm that all documentation is accurate.

## 2018-01-24 NOTE — PATIENT INSTRUCTIONS

## 2018-03-21 ENCOUNTER — OFFICE VISIT (OUTPATIENT)
Dept: ORTHOPEDIC SURGERY | Age: 43
End: 2018-03-21

## 2018-03-21 VITALS
BODY MASS INDEX: 35.62 KG/M2 | DIASTOLIC BLOOD PRESSURE: 72 MMHG | HEIGHT: 70 IN | RESPIRATION RATE: 14 BRPM | HEART RATE: 71 BPM | WEIGHT: 248.8 LBS | SYSTOLIC BLOOD PRESSURE: 136 MMHG

## 2018-03-21 DIAGNOSIS — M62.838 MUSCLE SPASM: ICD-10-CM

## 2018-03-21 DIAGNOSIS — M47.816 FACET ARTHRITIS OF LUMBAR REGION: Primary | ICD-10-CM

## 2018-03-21 DIAGNOSIS — M51.36 DDD (DEGENERATIVE DISC DISEASE), LUMBAR: ICD-10-CM

## 2018-03-21 DIAGNOSIS — M47.816 LUMBAR SPONDYLOSIS: ICD-10-CM

## 2018-03-21 DIAGNOSIS — M79.18 MYOFASCIAL MUSCLE PAIN: ICD-10-CM

## 2018-03-21 RX ORDER — CYCLOBENZAPRINE HYDROCHLORIDE 15 MG/1
CAPSULE, EXTENDED RELEASE ORAL
Qty: 60 CAP | Refills: 4 | Status: SHIPPED | OUTPATIENT
Start: 2018-03-21 | End: 2018-12-05 | Stop reason: ALTCHOICE

## 2018-03-21 RX ORDER — METHYLPREDNISOLONE 4 MG/1
TABLET ORAL
Qty: 1 DOSE PACK | Refills: 1 | Status: SHIPPED | OUTPATIENT
Start: 2018-03-21 | End: 2018-04-11 | Stop reason: ALTCHOICE

## 2018-03-21 NOTE — MR AVS SNAPSHOT
Lidia Brian 
 
 
 [facility] [address] Merged with [facility] [address] 
[phone] Patient: Kalyani Whitmore 
MRN: [id number] Visit Information Date & Time Provider Department Dept. Phone Encounter #  
 3/21/2018 11:00 AM Luther Bustamante, [facility] [phone] Follow-up Instructions Return in about 6 months (around 9/21/2018) for Medication follow up. Upcoming Health Maintenance Date Due Pneumococcal 19-64 Medium Risk (1 of 1 - PPSV23) 7/9/1992 DTaP/Tdap/Td series (1 - Tdap) 7/9/1994 PAP AKA CERVICAL CYTOLOGY 7/9/1994 Influenza Age 5 to Adult 8/1/2017 Allergies as of 3/21/2018  Review Complete On: 3/21/2018 By: Luther Bustamante MD  
  
 Severity Noted Reaction Type Reactions Betadine Surgi-prep  09/11/2012    Rash Neomycin  09/11/2012    Rash Peroxyl [Hydrogen Peroxide]  09/11/2012    Rash  
 Sulfa Dyne  09/11/2012    Rash Current Immunizations  Never Reviewed No immunizations on file. Not reviewed this visit You Were Diagnosed With   
  
 Codes Comments Facet arthritis of lumbar region [facility])    -  Primary ICD-10-CM: M46.96 
ICD-9-CM: 721.3 Lumbar spondylosis     ICD-10-CM: M47.816 ICD-9-CM: 721.3 Muscle spasm     ICD-10-CM: E19.684 ICD-9-CM: 728.85 Myofascial muscle pain     ICD-10-CM: M79.1 ICD-9-CM: 729.1 DDD (degenerative disc disease), lumbar     ICD-10-CM: M51.36 
ICD-9-CM: 722.52 Vitals BP Pulse Resp Height(growth percentile) Weight(growth percentile) BMI  
 136/72 71 14 5' 10\" (1.778 m) 248 lb 12.8 oz (112.9 kg) 35.7 kg/m2 OB Status Smoking Status IUD Current Every Day Smoker BMI and BSA Data Body Mass Index Body Surface Area 35.7 kg/m 2 2.36 m 2 Preferred Pharmacy Pharmacy Name Phone [address] [facility] [phone] Your Updated Medication List  
  
   
This list is accurate as of 3/21/18 11:46 AM.  Always use your most recent med list.  
  
  
  
  
 ALEVE 220 mg Cap Generic drug:  naproxen sodium Take 200 mg by mouth daily. ALPRAZolam 0.5 mg tablet Commonly known as:  Margaretta Ditch Take 0.5 mg by mouth three (3) times daily as needed. amitriptyline 10 mg tablet Commonly known as:  ELAVIL One po qhs x 1 week , then may increase to 2 tabs po qhs  Indications: NEUROPATHIC PAIN  
  
 cholecalciferol 1,000 unit Cap Commonly known as:  VITAMIN D3 Take  by mouth. Cyclobenzaprine 15 mg Cp24 SR capsule Commonly known as:  AMRIX  
1-2 po daily as needed for muscle spasm  Indications: Muscle Spasm  
  
 diclofenac EC 75 mg EC tablet Commonly known as:  VOLTAREN Take 1 Tab by mouth two (2) times daily (with meals). DULoxetine 60 mg capsule Commonly known as:  CYMBALTA  
take 1 capsule by mouth once daily  
  
 escitalopram oxalate 20 mg tablet Commonly known as:  Melvinia Calk Take 20 mg by mouth daily. methylPREDNISolone 4 mg tablet Commonly known as:  Adelineedith Sue Per dose pack instructions  
  
 oxyCODONE-acetaminophen  mg per tablet Commonly known as:  PERCOCET 1 po q pm prn severe pain  
  
 pseudoephedrine 30 mg tablet Commonly known as:  SUDAFED Take 30 mg by mouth every four (4) hours as needed for Congestion. VITAMIN C 500 mg tablet Generic drug:  ascorbic acid (vitamin C) Take 500 mg by mouth daily. WELLBUTRIN  mg XL tablet Generic drug:  buPROPion XL Take 300 mg by mouth every morning. Prescriptions Sent to Pharmacy Refills Cyclobenzaprine (AMRIX) 15 mg cp24 SR capsule 4 Si-2 po daily as needed for muscle spasm  Indications: Muscle Spasm  Class: Normal  
 Pharmacy: [address] [place] [address] Ph #: [phone]  
 methylPREDNISolone (MEDROL DOSEPACK) 4 mg tablet 1  
 Sig: Per dose pack instructions Class: Normal  
 Pharmacy: [address] [facility] #: [phone] Follow-up Instructions Return in about 6 months (around 9/21/2018) for Medication follow up. Patient Instructions Low Back Arthritis: Exercises Your Care Instructions Here are some examples of typical rehabilitation exercises for your condition. Start each exercise slowly. Ease off the exercise if you start to have pain. Your doctor or physical therapist will tell you when you can start these exercises and which ones will work best for you. When you are not being active, find a comfortable position for rest. Some people are comfortable on the floor or a medium-firm bed with a small pillow under their head and another under their knees. Some people prefer to lie on their side with a pillow between their knees. Don't stay in one position for too long. Take short walks (10 to 20 minutes) every 2 to 3 hours. Avoid slopes, hills, and stairs until you feel better. Walk only distances you can manage without pain, especially leg pain. How to do the exercises Pelvic tilt 1. Lie on your back with your knees bent. 2. \"Brace\" your stomach-tighten your muscles by pulling in and imagining your belly button moving toward your spine. 3. Press your lower back into the floor. You should feel your hips and pelvis rock back. 4. Hold for 6 seconds while breathing smoothly. 5. Relax and allow your pelvis and hips to rock forward. 6. Repeat 8 to 12 times. Back stretches 1. Get down on your hands and knees on the floor. 2. Relax your head and allow it to droop. Round your back up toward the ceiling until you feel a nice stretch in your upper, middle, and lower back. Hold this stretch for as long as it feels comfortable, or about 15 to 30 seconds. 3. Return to the starting position with a flat back while you are on your hands and knees. 4. Let your back sway by pressing your stomach toward the floor. Lift your buttocks toward the ceiling. 5. Hold this position for 15 to 30 seconds. 6. Repeat 2 to 4 times. Follow-up care is a key part of your treatment and safety. Be sure to make and go to all appointments, and call your doctor if you are having problems. It's also a good idea to know your test results and keep a list of the medicines you take. Where can you learn more? Go to http://mona-rajni.info/. Enter N066 in the search box to learn more about \"Low Back Arthritis: Exercises. \" Current as of: March 21, 2017 Content Version: 11.4 © 4838-6060 [employer]. Care instructions adapted under license by [employer] (which disclaims liability or warranty for this information). If you have questions about a medical condition or this instruction, always ask your healthcare professional. [place] [address] any warranty or liability for your use of this information. Introducing [facility]! [facility] introduces [employer] patient portal. Now you can access parts of your medical record, email your doctor's office, and request medication refills online. 1. In your internet browser, go to [email or web address] 2. Click on the First Time User? Click Here link in the Sign In box. You will see the New Member Sign Up page. 3. Enter your [employer] Access Code exactly as it appears below. You will not need to use this code after youve completed the sign-up process. If you do not sign up before the expiration date, you must request a new code. · [employer] Access Code: [email or web address] Expires: 6/19/2018 11:44 AM 
 
4. Enter the last four digits of your Social Security Number (xxxx) and Date of Birth (mm/dd/yyyy) as indicated and click Submit. You will be taken to the next sign-up page. 5. Create a [employer] ID.  This will be your [employer] login ID and cannot be changed, so think of one that is secure and easy to remember. 6. Create a [employer] password. You can change your password at any time. 7. Enter your Password Reset Question and Answer. This can be used at a later time if you forget your password. 8. Enter your e-mail address. You will receive e-mail notification when new information is available in [address] 9. Click Sign Up. You can now view and download portions of your medical record. 10. Click the Download Summary menu link to download a portable copy of your medical information. If you have questions, please visit the Frequently Asked Questions section of the [employer] website. Remember, [employer] is NOT to be used for urgent needs. For medical emergencies, dial 911. Now available from your iPhone and Android! Please provide this summary of care documentation to your next provider. Your primary care clinician is listed as Norman [facility]. If you have any questions after today's visit, please call [phone].

## 2018-03-21 NOTE — PATIENT INSTRUCTIONS

## 2018-03-21 NOTE — PROGRESS NOTES
[facility]  [address] [place] [address], [facility] [address]  Phone: [phone]  Fax: [phone]    Pt's YOB: 1975    ASSESSMENT   Diagnoses and all orders for this visit:    1. Facet arthritis of lumbar region ([address]  -     methylPREDNISolone (MEDROL DOSEPACK) 4 mg tablet; Per dose pack instructions    2. Lumbar spondylosis  -     methylPREDNISolone (MEDROL DOSEPACK) 4 mg tablet; Per dose pack instructions    3. Muscle spasm  -     Cyclobenzaprine (AMRIX) 15 mg cp24 SR capsule; 1-2 po daily as needed for muscle spasm  Indications: Muscle Spasm    4. Myofascial muscle pain  -     Cyclobenzaprine (AMRIX) 15 mg cp24 SR capsule; 1-2 po daily as needed for muscle spasm  Indications: Muscle Spasm    5. DDD (degenerative disc disease), lumbar         IMPRESSION AND PLAN:  Guera Mccabe is a 38 y.o. female with history of chronic lumbar pain. She takes about 6 tabs of Tylenol Extra Strength daily and reports that she has discontinued Voltaren. Pt takes Ultram 50 mg but admits that it is not very effective. 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 1) Pt was given information on lumbar arthritis exercises. 2) She was prescribed a Amrix 15 mg 1-2 tabs daily prn muscle spasm. 3) Pt was also prescribed Medrol Dosepak. 4) I recommended the patient try OTC Aspercreme with capsaicin. 5) Ms. Tony Mejia has a reminder for a \"due or due soon\" health maintenance. I have asked that she contact her primary care provider, Justus Bustillo MD, for follow-up on this health maintenance. 6)  demonstrated consistency with prescribing. 7) Pt will follow-up in 6 months or sooner if needed. HISTORY OF PRESENT ILLNESS:  Guera Mccabe is a 38 y.o. female with history of chronic lumbar pain.  She takes about 6 tabs of Tylenol Extra Strength daily and reports that she has discontinued Voltaren. Pt takes Ultram 50 mg but admits that it is not very effective. Pt was prescribed Vicodin years ago and takes about 1 tab a month. She states that she generally experiences increased pain with increased activities and with prolonged standing and walking. 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 Pt reports that the relief with her Flexeril does not last that long and wishes to try an extended release muscle relaxant. She admits that her sister is addicted to drugs and has stolen her medication before. Pt states that she locks her purse up during family events so her sister cannot steal her prescriptions. She has experienced relief with prednisone in the past. Of note, she occasionally takes Xanax. Pt reports that she would like to try prednisone at this time in place of the Ultram. She is on Lexapro and Wellbutrin and no longer takes Cymbalta. Pt experienced substantial relief lasting 2 months with prior injections but reports that she cannot afford it at this time because it cost her $1200 in out of pocket expenses. Pt at this time desires to proceed with medication evaluation. Pt states that she will be going to [place] in 12/2018 and may want a set of injections before that event. Pain Scale: 4/10    PCP: Roni Christianson MD     Past Medical History:   Diagnosis Date    Anger     Anxiety     Chronic pain     back pain    GERD (gastroesophageal reflux disease)     Psychiatric disorder     depression    Seizures (HCC)     Encephalitis, seizures only as a child, none since        Social History     Social History    Marital status:      Spouse name: N/A    Number of children: N/A    Years of education: N/A     Occupational History    Not on file.      Social History Main Topics    Smoking status: Current Every Day Smoker Packs/day: 1.00     Years: 3.00     Last attempt to quit: 3/25/2005    Smokeless tobacco: Never Used    Alcohol use Yes      Comment: rarely    Drug use: No    Sexual activity: Not on file      Comment: Pregnancy test negative     Other Topics Concern    Not on file     Social History Narrative       Current Outpatient Prescriptions   Medication Sig Dispense Refill    Cyclobenzaprine (AMRIX) 15 mg cp24 SR capsule 1-2 po daily as needed for muscle spasm  Indications: Muscle Spasm 60 Cap 4    methylPREDNISolone (MEDROL DOSEPACK) 4 mg tablet Per dose pack instructions 1 Dose Pack 1    diclofenac EC (VOLTAREN) 75 mg EC tablet Take 1 Tab by mouth two (2) times daily (with meals). 180 Tab 1    buPROPion XL (WELLBUTRIN XL) 300 mg XL tablet Take 300 mg by mouth every morning.  ALPRAZolam (XANAX) 0.5 mg tablet Take 0.5 mg by mouth three (3) times daily as needed. 0    escitalopram oxalate (LEXAPRO) 20 mg tablet Take 20 mg by mouth daily. 0    oxyCODONE-acetaminophen (PERCOCET)  mg per tablet 1 po q pm prn severe pain 30 Tab 0    Cholecalciferol, Vitamin D3, 1,000 unit cap Take  by mouth.  DULoxetine (CYMBALTA) 60 mg capsule take 1 capsule by mouth once daily  0    amitriptyline (ELAVIL) 10 mg tablet One po qhs x 1 week , then may increase to 2 tabs po qhs  Indications: NEUROPATHIC PAIN 60 Tab 1    pseudoephedrine (SUDAFED) 30 mg tablet Take 30 mg by mouth every four (4) hours as needed for Congestion.  ascorbic acid (VITAMIN C) 500 mg tablet Take 500 mg by mouth daily.  naproxen sodium (ALEVE) 220 mg Cap Take 200 mg by mouth daily. Allergies   Allergen Reactions    Betadine Surgi-Prep Rash    Neomycin Rash    Peroxyl [Hydrogen Peroxide] Rash    Sulfa Dyne Rash         REVIEW OF SYSTEMS    Constitutional: Negative for fever, chills, or weight change. Respiratory: Negative for cough or shortness of breath.      Cardiovascular: Negative for chest pain or palpitations. Gastrointestinal: Negative for acid reflux, change in bowel habits, or constipation. Genitourinary: Negative for dysuria and flank pain. Musculoskeletal: Positive for lumbar pain. Skin: Negative for rash. Neurological: Negative for headaches, dizziness, or numbness. Endo/Heme/Allergies: Negative for increased bruising. Psychiatric/Behavioral: Negative for difficulty with sleep. PHYSICAL EXAMINATION  Visit Vitals    /72    Pulse 71    Resp 14    Ht 5' 10\" (1.778 m)    Wt 248 lb 12.8 oz (112.9 kg)    BMI 35.7 kg/m2       Constitutional: Awake, alert, and in no acute distress. Neurological: 1+ symmetrical DTRs in the upper extremities. 1+ symmetrical DTRs in the lower extremities. Sensation to light touch is intact. Negative Tyson's sign bilaterally. Skin: warm, dry, and intact. Musculoskeletal: Tenderness to palpation in the lower lumbar region. Moderate pain with extension and axial loading. No pain with internal or external rotation of her hips. Negative straight leg raise bilaterally. Biceps  Triceps Deltoids Wrist Ext Wrist Flex Hand Intrin   Right +4/5 +4/5 +4/5 +4/5 +4/5 +4/5   Left +4/5 +4/5 +4/5 +4/5 +4/5 +4/5      Hip Flex  Quads Hamstrings Ankle DF EHL Ankle PF   Right +4/5 +4/5 +4/5 +4/5 +4/5 +4/5   Left +4/5 +4/5 +4/5 +4/5 +4/5 +4/5     IMAGING:    Lumbar spine MRI from 11/14/2015 was personally reviewed with the patient and demonstrated:  Results from [facility] on 11/14/15   MRI LUMB SPINE W WO CONT    Narrative Sagittal and axial multisequence MR images of lumbar spine were obtained without  and with 20 cc Magnevist gadolinium IV contrast.    History: Chronic back pain with now radiation to the right leg. Comparison: September 3, 2009    Straightening of lumbar lordosis with no significant spondylolisthesis.  No  compression fracture or pathologic marrow signal. Conus medullaris ends at T12  and L1 with normal morphology and signal intensity. No abnormal enhancement. L1-L2: Mild posterior disc bulge. No central stenosis. No foraminal stenosis. L2-L3: Also mild posterior disc bulge with no central stenosis. Mild facet  hypertrophy with small left facet joint effusion. No foraminal stenosis. Perineural cyst in the foramina. L3-L4: Posterior disc bulge with tiny central annular tear, flattening anterior  thecal sac with no central stenosis. Facet and ligamentous hypertrophy with no  foraminal stenosis. L4-L5: Developing central and right paracentral disc protrusion, displacing the  right L5 nerve root within the thecal sac. Facet ligamentous hypertrophy. Mild  central stenosis with canal measuring 9 mm. No foraminal stenosis or exiting L4  nerve root compression. L5-S1: Posterior disc bulge with small central disc protrusion. No central  stenosis. No compression or displacement of the S1 roots. Left laminotomy. No  arachnoiditis or significant thecal sac distortion. Mild facet hypertrophy. No  foraminal stenosis or exiting L5 root compression.           Impression Impression:  1. Newly developed central and right paracentral disc protrusion at L4-L5, with  posterior displacement and compression of the right descending L5 nerve root  within the thecal sac. Facet hypertrophy with foraminal narrowing but no exiting  L4 root compression. 2. Additional findings as above. Written by Junior Wilder, as dictated by Neri Hi MD.  I, Dr. Neri Hi confirm that all documentation is accurate.

## 2018-03-23 ENCOUNTER — TELEPHONE (OUTPATIENT)
Dept: ORTHOPEDIC SURGERY | Age: 43
End: 2018-03-23

## 2018-03-23 NOTE — TELEPHONE ENCOUNTER
She should start the MDP as prescribed. If she is having true weakness or neurological deficits, she should go to the ER to be evaluated.

## 2018-03-23 NOTE — TELEPHONE ENCOUNTER
Patient called to report to Dr. Moira Horvath that her left leg \"has gone paralyzed\" and she is experiencing great difficulty walking. She was to  an rx for Medrol today, but isn't sure if she needs to come in for an appt or just try medication.   Please contact patient to advise as soon as possible at [phone] or [phone]

## 2018-03-23 NOTE — TELEPHONE ENCOUNTER
Attempted to call patient, Reached voicemail identified as \"Maryam Medina\", left message, identified myself/facility/callback number, requested return call to facility.

## 2018-03-26 NOTE — TELEPHONE ENCOUNTER
Called patient, verified , per patient, she has started MDP. Per patient, she has improved some, but has a follow up appointment with a NP with our office tomorrow. Per patient, she will follow up at that time. No further action required at this time.

## 2018-03-26 NOTE — TELEPHONE ENCOUNTER
Again, attempted to contact patient, Reached voicemail identified \"Maryam Medina\", left message, identified myself/facility/callback number, requested return call to facility.

## 2018-03-27 ENCOUNTER — OFFICE VISIT (OUTPATIENT)
Dept: ORTHOPEDIC SURGERY | Age: 43
End: 2018-03-27

## 2018-03-27 VITALS
OXYGEN SATURATION: 95 % | RESPIRATION RATE: 18 BRPM | WEIGHT: 249.2 LBS | HEART RATE: 84 BPM | HEIGHT: 70 IN | BODY MASS INDEX: 35.68 KG/M2

## 2018-03-27 DIAGNOSIS — G89.29 CHRONIC LEFT-SIDED LOW BACK PAIN WITH LEFT-SIDED SCIATICA: Primary | ICD-10-CM

## 2018-03-27 DIAGNOSIS — R29.898 LEFT LEG WEAKNESS: ICD-10-CM

## 2018-03-27 DIAGNOSIS — M54.42 CHRONIC LEFT-SIDED LOW BACK PAIN WITH LEFT-SIDED SCIATICA: Primary | ICD-10-CM

## 2018-03-27 PROBLEM — M54.40 CHRONIC LEFT-SIDED LOW BACK PAIN WITH SCIATICA: Status: ACTIVE | Noted: 2018-03-27

## 2018-03-27 RX ORDER — PREDNISONE 20 MG/1
TABLET ORAL
Qty: 20 TAB | Refills: 0 | Status: SHIPPED | OUTPATIENT
Start: 2018-03-27 | End: 2018-04-11 | Stop reason: ALTCHOICE

## 2018-03-27 NOTE — PROGRESS NOTES
Chief complaint/History of Present Illness:  Chief Complaint   Patient presents with    Back Pain     lower    Leg Pain     bilateral left>right    Follow-up     HPI  Johann Garza is a  38 y.o.  female      HISTORY OF PRESENT ILLNESS:  The patient comes in today with continuing and persistent increased low back pain with radiating left leg pain and weakness. She was last seen by Dr. Cher Schultz on March 21, 2018. 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 She rates it as an 8/10. Dr. Clarissa Desai prescribed her Amrix. She has been unable to get that filled. Apparently, there was a hang-up with the insurance, but she is taking Flexeril 4-5 times a day. She was also given a Medrol Dosepak, and that has helped about 50%, but her pain is still pretty unbearable, and the weakness is causing her knee to buckle. She has not fallen yet but has come close. She teaches at [employer]. She smokes one-half pack of cigarettes per day. She denies fever and bowel and bladder dysfunction. She states that her left leg is paralyzed. However, I think she means more that it is more numb from the thigh all the way down to the foot, and that is new since she saw Dr. Clarissa Desai. She has tried multiple antineuritics in the past, including Neurontin, Lyrica, Cymbalta, Elavil, and Topamax. She either could not tolerate them, or they did not work for her. She is status post a left L5 laminectomy about 20 years ago by Dr. Mani Hernandez and again, in March 2016, she had a right L4-5 laminectomy. She felt like the actual nerve symptoms got a little better, but she still has always had pain, and now that pain has become significant and disabling to her, and with this new onset of total numbness of her leg, she is experiencing weakness also. She has had bilateral L4-5 and L5-S1 facet blocks back in January, which did help.   She is unable to afford them. They cost $1200. RFA in the past has not really helped her. PHYSICAL EXAM:  Ms. Joaquin Contreras is a 47-year-old female. She is alert and oriented. She is tearful today. She has a full weightbearing, antalgic gait and uses no assistive device. She has 3/5 strength of her left EHL and dorsiflexors. She has 4/5 of her hamstrings and quadriceps and into the anteriors, and she has 5/5 strength of her right lower extremity. She has pain with hyperextension of the lumbar spine. ASSESSENT/PLAN:  This is a patient who has had two prior laminectomies, one on the right in 2016 and one on the left about 20 years ago. She is now having severe, persistent back pain that radiates into the left leg down to her foot. She has total numbness of her left leg down to her foot. She does have weakness. We did x-rays. These will be reviewed by Dr. Swati Daniel. We are going to get a new MRI of her lumbar spine with contrast.  I am going to put her on a stronger Prednisone taper. I have asked her to only take the Flexeril three times a day. We will see her back following the MRI with one of the physiatrists. Review of systems:    Past Medical History:   Diagnosis Date    Anger     Anxiety     Chronic pain     back pain    GERD (gastroesophageal reflux disease)     Psychiatric disorder     depression    Seizures (HCC)     Encephalitis, seizures only as a child, none since     Past Surgical History:   Procedure Laterality Date    HX BREAST REDUCTION Bilateral 2008    HX GYN      laparascopy    HX LUMBAR LAMINECTOMY  1998    L5    HX LUMBAR LAMINECTOMY  02/24/2016    L4    HX OTHER SURGICAL      abdominoplasty    HX TONSILLECTOMY  age 3     Social History     Social History    Marital status:      Spouse name: N/A    Number of children: N/A    Years of education: N/A     Occupational History    Not on file.      Social History Main Topics    Smoking status: Current Every Day Smoker Packs/day: 1.00     Years: 3.00     Last attempt to quit: 3/25/2005    Smokeless tobacco: Never Used    Alcohol use Yes      Comment: rarely    Drug use: No    Sexual activity: Not on file      Comment: Pregnancy test negative     Other Topics Concern    Not on file     Social History Narrative     Family History   Problem Relation Age of Onset    Cancer Father     Drug Abuse Sister     No Known Problems Brother        Physical Exam:  Visit Vitals    Pulse 84    Resp 18    Ht 5' 9.5\" (1.765 m)    Wt 249 lb 3.2 oz (113 kg)    SpO2 95%    BMI 36.27 kg/m2     Pain Scale: 8/10            has been . reviewed and is appropriate          Diagnoses and all orders for this visit:    1. Chronic left-sided low back pain with left-sided sciatica  -     AMB POC XRAY, SPINE, LUMBOSACRAL; 2 O  -     MRI LUMB SPINE W WO CONT; Future    2. Left leg weakness  -     AMB POC XRAY, SPINE, LUMBOSACRAL; 2 O  -     MRI LUMB SPINE W WO CONT; Future    Other orders  -     predniSONE (DELTASONE) 20 mg tablet; Take 3 tabs po x 3 days, then 2 tabs po x 3 days, then 1 tabs po x 3 days, then 1/2 tab po x 4 day            Follow-up Disposition:  Return in about 3 weeks (around 4/17/2018) for with Dr Jaki Chilel for MRI f/u .         We have informed Regis Garcia to notify us for immediate appointment if she has any worsening neurogical symptoms or if an emergency situation presents, then call [phone]

## 2018-03-27 NOTE — MR AVS SNAPSHOT
[facility] 
 
 
 [address] [facility] [address] 
[phone] Patient: Aminata Mcgee 
MRN: [id number] Visit Information Date & Time Provider Department Dept. Phone Encounter #  
 3/27/2018 10:00 AM Chuck Saunders NP [facility] [phone] [id number] Follow-up Instructions Return in about 3 weeks (around 4/17/2018) for with Dr Sandra Warner for MRI f/u . Follow-up and Disposition History Your Appointments 4/11/2018 10:00 AM  
DIAG TEST F/U with Amor Perkins MD  
[facility]) Appt Note: mri results back [address] [facility] [address]  
[phone]  
  
   
 [address] [facility] [address] 9/19/2018 11:00 AM  
Follow Up with Amor Perkins MD  
[facility]) Appt Note: 6 mo fu back [address] [facility] [address]  
[phone]  
  
   
 [address] [facility] [address] Upcoming Health Maintenance Date Due Pneumococcal 19-64 Medium Risk (1 of 1 - PPSV23) 7/9/1992 DTaP/Tdap/Td series (1 - Tdap) 7/9/1994 PAP AKA CERVICAL CYTOLOGY 7/9/1994 Influenza Age 5 to Adult 8/1/2017 Allergies as of 3/27/2018  Review Complete On: 3/27/2018 By: Princess Longoria LPN Severity Noted Reaction Type Reactions Betadine Surgi-prep  09/11/2012    Rash Neomycin  09/11/2012    Rash **Patient Denies** Peroxyl [Hydrogen Peroxide]  09/11/2012    Rash  
 Sulfa Dyne  09/11/2012    Rash Current Immunizations  Never Reviewed No immunizations on file. Not reviewed this visit You Were Diagnosed With   
  
 Codes Comments  Chronic left-sided low back pain with left-sided sciatica    -  Primary ICD-10-CM: M54.42, G89.29 
 ICD-9-CM: 724.2, 724.3, 338.29 Left leg weakness     ICD-10-CM: R29.898 ICD-9-CM: 729.89 Vitals Pulse Resp Height(growth percentile) Weight(growth percentile) SpO2 BMI  
 84 18 5' 9.5\" (1.765 m) 249 lb 3.2 oz (113 kg) 95% 36.27 kg/m2 OB Status Smoking Status IUD Current Every Day Smoker BMI and BSA Data Body Mass Index Body Surface Area  
 36.27 kg/m 2 2.35 m 2 Preferred Pharmacy Pharmacy Name Phone [address] [facility], [address] [phone] Your Updated Medication List  
  
   
This list is accurate as of 3/27/18 11:41 AM.  Always use your most recent med list.  
  
  
  
  
 ALEVE 220 mg Cap Generic drug:  naproxen sodium Take 200 mg by mouth daily. amitriptyline 10 mg tablet Commonly known as:  ELAVIL One po qhs x 1 week , then may increase to 2 tabs po qhs  Indications: NEUROPATHIC PAIN  
  
 cholecalciferol 1,000 unit Cap Commonly known as:  VITAMIN D3 Take  by mouth. Cyclobenzaprine 15 mg Cp24 SR capsule Commonly known as:  AMRIX  
1-2 po daily as needed for muscle spasm  Indications: Muscle Spasm  
  
 diclofenac EC 75 mg EC tablet Commonly known as:  VOLTAREN Take 1 Tab by mouth two (2) times daily (with meals). DULoxetine 60 mg capsule Commonly known as:  CYMBALTA  
take 1 capsule by mouth once daily  
  
 escitalopram oxalate 20 mg tablet Commonly known as:  Ling Blauvelt Take 20 mg by mouth daily. methylPREDNISolone 4 mg tablet Commonly known as:  Vee Precise Per dose pack instructions  
  
 oxyCODONE-acetaminophen  mg per tablet Commonly known as:  PERCOCET 1 po q pm prn severe pain  
  
 predniSONE 20 mg tablet Commonly known as:  Lesia Raider Take 3 tabs po x 3 days, then 2 tabs po x 3 days, then 1 tabs po x 3 days, then 1/2 tab po x 4 day  
  
 pseudoephedrine 30 mg tablet Commonly known as:  SUDAFED  
 Take 30 mg by mouth every four (4) hours as needed for Congestion. WELLBUTRIN  mg XL tablet Generic drug:  buPROPion XL Take 300 mg by mouth every morning. Prescriptions Sent to Pharmacy Refills  
 predniSONE (DELTASONE) 20 mg tablet 0 Sig: Take 3 tabs po x 3 days, then 2 tabs po x 3 days, then 1 tabs po x 3 days, then 1/2 tab po x 4 day Class: Normal  
 Pharmacy: [address] #: [phone] We Performed the Following AMB POC XRAY, SPINE, LUMBOSACRAL; 2 O [31151 CPT(R)] Follow-up Instructions Return in about 3 weeks (around 4/17/2018) for with Dr Magnus Kothari for MRI f/u . To-Do List   
 03/27/2018 Imaging:  MRI LUMB SPINE W WO CONT Referral Information Referral ID Referred By Referred To  
  
 [id number] Moreno HERNANDEZ Not Available Visits Status Start Date End Date 1 New Request 3/27/18 3/27/19 If your referral has a status of pending review or denied, additional information will be sent to support the outcome of this decision. Introducing [facility]! Shamar Metcalf introduces [employer] patient portal. Now you can access parts of your medical record, email your doctor's office, and request medication refills online. 1. In your internet browser, go to [email or web address] 2. Click on the First Time User? Click Here link in the Sign In box. You will see the New Member Sign Up page. 3. Enter your [employer] Access Code exactly as it appears below. You will not need to use this code after youve completed the sign-up process. If you do not sign up before the expiration date, you must request a new code. · [employer] Access Code: [email or web address] Expires: 6/19/2018 11:44 AM 
 
4. Enter the last four digits of your Social Security Number (xxxx) and Date of Birth (mm/dd/yyyy) as indicated and click Submit. You will be taken to the next sign-up page. 5. Create a [facility] ID. This will be your [facility] [facility] login ID and cannot be changed, so think of one that is secure and easy to remember. 6. Create a [employer] [facility] password. You can change your password at any time. 7. Enter your Password Reset Question and Answer. This can be used at a later time if you forget your password. 8. Enter your e-mail address. You will receive e-mail notification when new information is available in [address] 9. Click Sign Up. You can now view and download portions of your medical record. 10. Click the Download Summary menu link to download a portable copy of your medical information. If you have questions, please visit the Frequently Asked Questions section of the [employer] website. Remember, [employer] is NOT to be used for urgent needs. For medical emergencies, dial 911. Now available from your iPhone and Android! Please provide this summary of care documentation to your next provider. Your primary care clinician is listed as Monica Garcia. If you have any questions after today's visit, please call [phone].

## 2018-04-05 ENCOUNTER — HOSPITAL ENCOUNTER (OUTPATIENT)
Age: 43
Discharge: HOME | End: 2018-04-05
Attending: NURSE PRACTITIONER
Payer: COMMERCIAL

## 2018-04-05 DIAGNOSIS — M54.42 CHRONIC LEFT-SIDED LOW BACK PAIN WITH LEFT-SIDED SCIATICA: ICD-10-CM

## 2018-04-05 DIAGNOSIS — G89.29 CHRONIC LEFT-SIDED LOW BACK PAIN WITH LEFT-SIDED SCIATICA: ICD-10-CM

## 2018-04-05 DIAGNOSIS — R29.898 LEFT LEG WEAKNESS: ICD-10-CM

## 2018-04-05 PROCEDURE — A9585 GADOBUTROL INJECTION: HCPCS | Performed by: NURSE PRACTITIONER

## 2018-04-05 PROCEDURE — 72158 MRI LUMBAR SPINE W/O & W/DYE: CPT

## 2018-04-05 PROCEDURE — 74011250636 HC RX REV CODE- 250/636: Performed by: NURSE PRACTITIONER

## 2018-04-05 RX ADMIN — GADOBUTROL 11.5 ML: 604.72 INJECTION INTRAVENOUS at 11:50

## 2018-04-11 ENCOUNTER — OFFICE VISIT (OUTPATIENT)
Dept: ORTHOPEDIC SURGERY | Age: 43
End: 2018-04-11

## 2018-04-11 VITALS
TEMPERATURE: 98.6 F | HEIGHT: 70 IN | OXYGEN SATURATION: 98 % | SYSTOLIC BLOOD PRESSURE: 121 MMHG | HEART RATE: 81 BPM | DIASTOLIC BLOOD PRESSURE: 80 MMHG | WEIGHT: 246 LBS | BODY MASS INDEX: 35.22 KG/M2 | RESPIRATION RATE: 16 BRPM

## 2018-04-11 DIAGNOSIS — Z72.0 TOBACCO USE: ICD-10-CM

## 2018-04-11 DIAGNOSIS — M51.26 HNP (HERNIATED NUCLEUS PULPOSUS), LUMBAR: Primary | ICD-10-CM

## 2018-04-11 DIAGNOSIS — M54.50 LUMBAR PAIN: ICD-10-CM

## 2018-04-11 DIAGNOSIS — M79.2 NEURITIS: ICD-10-CM

## 2018-04-11 DIAGNOSIS — M47.816 LUMBAR FACET ARTHROPATHY: ICD-10-CM

## 2018-04-11 DIAGNOSIS — M62.838 MUSCLE SPASM: ICD-10-CM

## 2018-04-11 RX ORDER — HYDROCODONE BITARTRATE AND ACETAMINOPHEN 5; 325 MG/1; MG/1
TABLET ORAL
Qty: 21 TAB | Refills: 0 | Status: SHIPPED | OUTPATIENT
Start: 2018-04-11 | End: 2018-12-05 | Stop reason: ALTCHOICE

## 2018-04-11 NOTE — PATIENT INSTRUCTIONS
Low Back Arthritis: Exercises  Your Care Instructions  Here are some examples of typical rehabilitation exercises for your condition. Start each exercise slowly. Ease off the exercise if you start to have pain. Your doctor or physical therapist will tell you when you can start these exercises and which ones will work best for you. When you are not being active, find a comfortable position for rest. Some people are comfortable on the floor or a medium-firm bed with a small pillow under their head and another under their knees. Some people prefer to lie on their side with a pillow between their knees. Don't stay in one position for too long. Take short walks (10 to 20 minutes) every 2 to 3 hours. Avoid slopes, hills, and stairs until you feel better. Walk only distances you can manage without pain, especially leg pain. How to do the exercises  Pelvic tilt    1. Lie on your back with your knees bent. 2. \"Brace\" your stomach-tighten your muscles by pulling in and imagining your belly button moving toward your spine. 3. Press your lower back into the floor. You should feel your hips and pelvis rock back. 4. Hold for 6 seconds while breathing smoothly. 5. Relax and allow your pelvis and hips to rock forward. 6. Repeat 8 to 12 times. Back stretches    1. Get down on your hands and knees on the floor. 2. Relax your head and allow it to droop. Round your back up toward the ceiling until you feel a nice stretch in your upper, middle, and lower back. Hold this stretch for as long as it feels comfortable, or about 15 to 30 seconds. 3. Return to the starting position with a flat back while you are on your hands and knees. 4. Let your back sway by pressing your stomach toward the floor. Lift your buttocks toward the ceiling. 5. Hold this position for 15 to 30 seconds. 6. Repeat 2 to 4 times. Follow-up care is a key part of your treatment and safety.  Be sure to make and go to all appointments, and call your doctor if you are having problems. It's also a good idea to know your test results and keep a list of the medicines you take. Where can you learn more? Go to http://mona-rajni.info/. Enter C036 in the search box to learn more about \"Low Back Arthritis: Exercises. \"  Current as of: March 21, 2017  Content Version: 11.4  © 4682-7609 [employer]. Care instructions adapted under license by [employer] (which disclaims liability or warranty for this information). If you have questions about a medical condition or this instruction, always ask your healthcare professional. [address] any warranty or liability for your use of this information. Learning About Benefits From Quitting Smoking  How does quitting smoking make you healthier? If you're thinking about quitting smoking, you may have a few reasons to be smoke-free. Your health may be one of them. · When you quit smoking, you lower your risks for cancer, lung disease, heart attack, stroke, blood vessel disease, and blindness from macular degeneration. · When you're smoke-free, you get sick less often, and you heal faster. You are less likely to get colds, flu, bronchitis, and pneumonia. · As a nonsmoker, you may find that your mood is better and you are less stressed. When and how will you feel healthier? Quitting has real health benefits that start from day 1 of being smoke-free. And the longer you stay smoke-free, the healthier you get and the better you feel. The first hours  · After just 20 minutes, your blood pressure and heart rate go down. That means there's less stress on your heart and blood vessels. · Within 12 hours, the level of carbon monoxide in your blood drops back to normal. That makes room for more oxygen. With more oxygen in your body, you may notice that you have more energy than when you smoked. After 2 weeks  · Your lungs start to work better.   · Your risk of heart attack starts to drop. After 1 month  · When your lungs are clear, you cough less and breathe deeper, so it's easier to be active. · Your sense of taste and smell return. That means you can enjoy food more than you have since you started smoking. Over the years  · After 1 year, your risk of heart disease is half what it would be if you kept smoking. · After 5 years, your risk of stroke starts to shrink. Within a few years after that, it's about the same as if you'd never smoked. · After 10 years, your risk of dying from lung cancer is cut by about half. And your risk for many other types of cancer is lower too. How would quitting help others in your life? When you quit smoking, you improve the health of everyone who now breathes in your smoke. · Their heart, lung, and cancer risks drop, much like yours. · They are sick less. For babies and small children, living smoke-free means they're less likely to have ear infections, pneumonia, and bronchitis. · If you're a woman who is or will be pregnant someday, quitting smoking means a healthier . · Children who are close to you are less likely to become adult smokers. Where can you learn more? Go to http://mona-rajni.info/. Enter [id number] [id number] [id number] 11 in the search box to learn more about \"Learning About Benefits From Quitting Smoking. \"  Current as of: 2017  Content Version: 11.4  © 9963-9985 [employer]. Care instructions adapted under license by [employer] (which disclaims liability or warranty for this information). If you have questions about a medical condition or this instruction, always ask your healthcare professional. [place] [address] any warranty or liability for your use of this information.

## 2018-04-11 NOTE — MR AVS SNAPSHOT
[facility] 
 
 
 [address] [facility] [address] 
[phone] Patient: Jami Riddle 
MRN: [id number] INQ:[id number]/6/[id number] Visit Information Date & Time Provider Department Dept. Phone Encounter #  
 4/11/2018 10:00 AM Grace Davenport, 27 [facility] [id number] Follow-up Instructions Return in about 2 weeks (around 4/25/2018) for Dr. Laurita Vaughn - surgical consult. Your Appointments 9/19/2018 11:00 AM  
Follow Up with Grace Davenport MD  
[facility]) Appt Note: 6 mo fu back [address] [facility] [address]  
[phone]  
  
   
 [address] [facility] [address] Upcoming Health Maintenance Date Due Pneumococcal 19-64 Medium Risk (1 of 1 - PPSV23) 7/9/1992 DTaP/Tdap/Td series (1 - Tdap) 7/9/1994 PAP AKA CERVICAL CYTOLOGY 7/9/1994 Influenza Age 5 to Adult 8/1/2017 Allergies as of 4/11/2018  Review Complete On: 4/11/2018 By: Grace Davenport MD  
  
 Severity Noted Reaction Type Reactions Betadine Surgi-prep  09/11/2012    Rash Neomycin  09/11/2012    Rash **Patient Denies** Peroxyl [Hydrogen Peroxide]  09/11/2012    Rash  
 Sulfa Dyne  09/11/2012    Rash Current Immunizations  Never Reviewed No immunizations on file. Not reviewed this visit You Were Diagnosed With   
  
 Codes Comments HNP (herniated nucleus pulposus), lumbar    -  Primary ICD-10-CM: M51.26 
ICD-9-CM: 722.10 Neuritis     ICD-10-CM: M79.2 ICD-9-CM: 729.2 Lumbar facet arthropathy (HCC)     ICD-10-CM: M46.96 
ICD-9-CM: 721.3 Lumbar pain     ICD-10-CM: M54.5 ICD-9-CM: 724.2 Muscle spasm     ICD-10-CM: Y32.882 ICD-9-CM: 728.85 Vitals BP Pulse Temp Resp Height(growth percentile) Weight(growth percentile) 121/80 81 98.6 °F (37 °C) (Oral) 16 5' 9.5\" (1.765 m) 246 lb (111.6 kg) SpO2 BMI OB Status Smoking Status 98% 35.81 kg/m2 IUD Current Every Day Smoker BMI and BSA Data Body Mass Index Body Surface Area  
 35.81 kg/m 2 2.34 m 2 Preferred Pharmacy Pharmacy Name Phone [address] [facility], [address] [phone] Your Updated Medication List  
  
   
This list is accurate as of 18 11:03 AM.  Always use your most recent med list.  
  
  
  
  
 ALEVE 220 mg Cap Generic drug:  naproxen sodium Take 200 mg by mouth daily. cholecalciferol 1,000 unit Cap Commonly known as:  VITAMIN D3 Take  by mouth. Cyclobenzaprine 15 mg Cp24 SR capsule Commonly known as:  AMRIX  
1-2 po daily as needed for muscle spasm  Indications: Muscle Spasm  
  
 diclofenac EC 75 mg EC tablet Commonly known as:  VOLTAREN Take 1 Tab by mouth two (2) times daily (with meals). escitalopram oxalate 20 mg tablet Commonly known as:  Kurt Carolus Take 20 mg by mouth daily. HYDROcodone-acetaminophen 5-325 mg per tablet Commonly known as:  Marvelyn Code 1 po tid as needed for severe pain  
  
 oxyCODONE-acetaminophen  mg per tablet Commonly known as:  PERCOCET 1 po q pm prn severe pain WELLBUTRIN  mg XL tablet Generic drug:  buPROPion XL Take 300 mg by mouth every morning. Prescriptions Printed Refills HYDROcodone-acetaminophen (NORCO) 5-325 mg per tablet 0 Si po tid as needed for severe pain  
 Class: Print Follow-up Instructions Return in about 2 weeks (around 2018) for Dr. Damon Simmonds - surgical consult. Patient Instructions Low Back Arthritis: Exercises Your Care Instructions Here are some examples of typical rehabilitation exercises for your condition. Start each exercise slowly. Ease off the exercise if you start to have pain. Your doctor or physical therapist will tell you when you can start these exercises and which ones will work best for you. When you are not being active, find a comfortable position for rest. Some people are comfortable on the floor or a medium-firm bed with a small pillow under their head and another under their knees. Some people prefer to lie on their side with a pillow between their knees. Don't stay in one position for too long. Take short walks (10 to 20 minutes) every 2 to 3 hours. Avoid slopes, hills, and stairs until you feel better. Walk only distances you can manage without pain, especially leg pain. How to do the exercises Pelvic tilt 1. Lie on your back with your knees bent. 2. \"Brace\" your stomach-tighten your muscles by pulling in and imagining your belly button moving toward your spine. 3. Press your lower back into the floor. You should feel your hips and pelvis rock back. 4. Hold for 6 seconds while breathing smoothly. 5. Relax and allow your pelvis and hips to rock forward. 6. Repeat 8 to 12 times. Back stretches 1. Get down on your hands and knees on the floor. 2. Relax your head and allow it to droop. Round your back up toward the ceiling until you feel a nice stretch in your upper, middle, and lower back. Hold this stretch for as long as it feels comfortable, or about 15 to 30 seconds. 3. Return to the starting position with a flat back while you are on your hands and knees. 4. Let your back sway by pressing your stomach toward the floor. Lift your buttocks toward the ceiling. 5. Hold this position for 15 to 30 seconds. 6. Repeat 2 to 4 times. Follow-up care is a key part of your treatment and safety. Be sure to make and go to all appointments, and call your doctor if you are having problems. It's also a good idea to know your test results and keep a list of the medicines you take. Where can you learn more? Go to http://mona-rajni.info/. Enter B152 in the search box to learn more about \"Low Back Arthritis: Exercises. \" Current as of: March 21, 2017 Content Version: 11.4 © 7857-3774 [employer]. Care instructions adapted under license by [employer] (which disclaims liability or warranty for this information). If you have questions about a medical condition or this instruction, always ask your healthcare professional. [address] any warranty or liability for your use of this information. Learning About Benefits From Quitting Smoking How does quitting smoking make you healthier? If you're thinking about quitting smoking, you may have a few reasons to be smoke-free. Your health may be one of them. · When you quit smoking, you lower your risks for cancer, lung disease, heart attack, stroke, blood vessel disease, and blindness from macular degeneration. · When you're smoke-free, you get sick less often, and you heal faster. You are less likely to get colds, flu, bronchitis, and pneumonia. · As a nonsmoker, you may find that your mood is better and you are less stressed. When and how will you feel healthier? Quitting has real health benefits that start from day 1 of being smoke-free. And the longer you stay smoke-free, the healthier you get and the better you feel. The first hours · After just 20 minutes, your blood pressure and heart rate go down. That means there's less stress on your heart and blood vessels. · Within 12 hours, the level of carbon monoxide in your blood drops back to normal. That makes room for more oxygen. With more oxygen in your body, you may notice that you have more energy than when you smoked. After 2 weeks · Your lungs start to work better. · Your risk of heart attack starts to drop. After 1 month · When your lungs are clear, you cough less and breathe deeper, so it's easier to be active. · Your sense of taste and smell return. That means you can enjoy food more than you have since you started smoking. Over the years · After 1 year, your risk of heart disease is half what it would be if you kept smoking. · After 5 years, your risk of stroke starts to shrink. Within a few years after that, it's about the same as if you'd never smoked. · After 10 years, your risk of dying from lung cancer is cut by about half. And your risk for many other types of cancer is lower too. How would quitting help others in your life? When you quit smoking, you improve the health of everyone who now breathes in your smoke. · Their heart, lung, and cancer risks drop, much like yours. · They are sick less. For babies and small children, living smoke-free means they're less likely to have ear infections, pneumonia, and bronchitis. · If you're a woman who is or will be pregnant someday, quitting smoking means a healthier . · Children who are close to you are less likely to become adult smokers. Where can you learn more? Go to http://mona-rajni.info/. Enter [id number] [id number] [id number] 11 in the search box to learn more about \"Learning About Benefits From Quitting Smoking. \" Current as of: 2017 Content Version: 11.4 © 1123-3536 [employer]. Care instructions adapted under license by [employer] (which disclaims liability or warranty for this information). If you have questions about a medical condition or this instruction, always ask your healthcare professional. [place] [address] any warranty or liability for your use of this information. Introducing [facility]! Nadia Bianchi introduces [employer] patient portal. Now you can access parts of your medical record, email your doctor's office, and request medication refills online. 1. In your internet browser, go to [email or web address]. [email or web address] 2. Click on the First Time User? Click Here link in the Sign In box. You will see the New Member Sign Up page. 3. Enter your FlowCardia Access Code exactly as it appears below. You will not need to use this code after youve completed the sign-up process. If you do not sign up before the expiration date, you must request a new code. · FlowCardia Access Code: [email or web address] Expires: 6/19/2018 11:44 AM 
 
4. Enter the last four digits of your Social Security Number (xxxx) and Date of Birth (mm/dd/yyyy) as indicated and click Submit. You will be taken to the next sign-up page. 5. Create a FlowCardia ID. This will be your FlowCardia login ID and cannot be changed, so think of one that is secure and easy to remember. 6. Create a FlowCardia password. You can change your password at any time. 7. Enter your Password Reset Question and Answer. This can be used at a later time if you forget your password. 8. Enter your e-mail address. You will receive e-mail notification when new information is available in [address] 9. Click Sign Up. You can now view and download portions of your medical record. 10. Click the Download Summary menu link to download a portable copy of your medical information. If you have questions, please visit the Frequently Asked Questions section of the FlowCardia website. Remember, FlowCardia is NOT to be used for urgent needs. For medical emergencies, dial 911. Now available from your iPhone and Android! Please provide this summary of care documentation to your next provider. Your primary care clinician is listed as Migel Owen. If you have any questions after today's visit, please call [phone].

## 2018-04-11 NOTE — PROGRESS NOTES
[facility]  [address] [place], [facility] [address]  Phone: [phone]  Fax: [phone]    Pt's YOB: 1975    ASSESSMENT   Diagnoses and all orders for this visit:    1. HNP (herniated nucleus pulposus), lumbar  -     HYDROcodone-acetaminophen (NORCO) 5-325 mg per tablet; 1 po tid as needed for severe pain    2. Neuritis    3. Lumbar facet arthropathy (HCC)  -     HYDROcodone-acetaminophen (NORCO) 5-325 mg per tablet; 1 po tid as needed for severe pain    4. Lumbar pain  -     HYDROcodone-acetaminophen (NORCO) 5-325 mg per tablet; 1 po tid as needed for severe pain    5. Muscle spasm    6. Tobacco use         IMPRESSION AND PLAN:  Santhosh Acosta is a 38 y.o. female with history of lumbar pain with left radicular symptoms. She complains of pain in the lower back, numbness and weakness in the left thigh, and pain extending from the left knee to the ankle. Pt reports substantially increased pain and left leg weakness since I last saw her on 03/21/201 but she admits to improvement since taking a large prednisone taper prescribed by GALE Richards. Of note, she had two prior surgeries with Dr. Rosa Matias ( a left L5 laminectomy about 20 years ago and a right L4-5 hemilaminotomy on 03/09/2016). She has tried physical therapy but states that her numbness worsened with certain exercises. Pt tried a bilateral L4-5 and bilateral L5-S1 injection on 01/03/2018 with 2 months of relief but notes that they were expensive. 1) Pt was given information on lumbar arthritis exercises. 2) I strongly encouraged the Pt to quit smoking and gave information on smoking cessation. 3) Per patient's request she will follow up with Dr. Rosa Matias for surgical evaluation. 4) She was informed of proper lifting mechanics. 5) Pt was prescribed a small prescription of Norco 5-325 mg 1 tab TID prn severe pain.   6) She was encouraged to keep her pain medication under lock and abdi. 7) Ms. Shivani Álvarez has a reminder for a \"due or due soon\" health maintenance. I have asked that she contact her primary care provider, William Villarreal MD, for follow-up on this health maintenance. 8)  demonstrated consistency with prescribing. 9) Pt will follow-up in 2 weeks with Dr. Josse Armstrong or sooner if needed. HISTORY OF PRESENT ILLNESS:  Matt Petty is a 38 y.o. female with history of lumbar pain with left radicular symptoms. She presents to the office today for lumbar MRI follow up. She complains of pain in the lower back, numbness and weakness in the left thigh, and pain extending from the left knee to the ankle. Pt reports substantially increased pain and left leg weakness since I last saw her on 03/21/2018. She states that she was at a \"[employer]\" program for school, which required her to stand throughout the day, and woke up the following day with severe pain. 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 Pt was called in a Medrol Dosepak and states that by the time she could be seen by NP Nisha Caba on 03/27/2018 she had completed the taper. Pt states that during this time she was bedridden, unable to cook dinner, drive, or even lift her left leg up to get into her car. When she followed up with Nisha Caba she was prescribed a large prednisone taper. Pt states that by the time she completed the second day of her large prednisone taper she was able to get up and walk again. She notes that while she was on the prednisone her pain and weakness gradually improved. Pt reports that prior to taking the large prednisone taper she was unable to lift her toes but states that this has improved. She admits that when walking she feels as if she has difficulty lifting up her left foot.  Pt tried a bilateral L4-5 and bilateral L5-S1 injection on 01/03/2018 with relief lasting 2 months and states that at her last office visit she did not want to repeat the injection due to the cost. Of note, she had a left L5 laminectomy about 20 years ago by Dr. Sylvester Kearns and then a right L4-5 hemilaminotomy with Dr. Sylvester Kearns on 03/09/2016. She has tried physical therapy but states that her numbness worsened with certain exercises. Pt reports that she was unable to use the inversion table since it caused nausea. She is not on Cymbalta and discontinued the Neurontin since it was not effective. Pt is currently on Wellbutrin and Lexapro. She takes Flexeril as needed to help her sleep at night. Pt states that she occasionally wakes up with pain in the left leg extending from the knee to the ankle. She notes that she has tolerated Norco in the past. 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 She wishes to be seen by Dr. Sylvester Kearns at this time. Pt desires to proceed with surgical evaluation. Pt states that she is followed by a endocrinologist for thyroid issues. She admits that her biological father had thyroid cancer. Pt smokes 1/2 pack per day. 30 minutes of face to face contact was spent with the patient during today's office visit extensively discussing her symptoms and treatment plan. Pain Scale: 2/10    PCP: Alena Ha MD     Past Medical History:   Diagnosis Date    Anger     Anxiety     Chronic pain     back pain    GERD (gastroesophageal reflux disease)     Psychiatric disorder     depression    Seizures (HCC)     Encephalitis, seizures only as a child, none since        Social History     Social History    Marital status:      Spouse name: N/A    Number of children: N/A    Years of education: N/A     Occupational History    Not on file. Social History Main Topics    Smoking status: Current Every Day Smoker     Packs/day: 1.00     Years: 3.00     Last attempt to quit: 3/25/2005    Smokeless tobacco: Never Used    Alcohol use Yes      Comment: rarely    Drug use:  No  Sexual activity: Not on file      Comment: Pregnancy test negative     Other Topics Concern    Not on file     Social History Narrative       Current Outpatient Prescriptions   Medication Sig Dispense Refill    HYDROcodone-acetaminophen (NORCO) 5-325 mg per tablet 1 po tid as needed for severe pain 21 Tab 0    Cyclobenzaprine (AMRIX) 15 mg cp24 SR capsule 1-2 po daily as needed for muscle spasm  Indications: Muscle Spasm 60 Cap 4    buPROPion XL (WELLBUTRIN XL) 300 mg XL tablet Take 300 mg by mouth every morning.  escitalopram oxalate (LEXAPRO) 20 mg tablet Take 20 mg by mouth daily. 0    Cholecalciferol, Vitamin D3, 1,000 unit cap Take  by mouth.  diclofenac EC (VOLTAREN) 75 mg EC tablet Take 1 Tab by mouth two (2) times daily (with meals). 180 Tab 1    oxyCODONE-acetaminophen (PERCOCET)  mg per tablet 1 po q pm prn severe pain 30 Tab 0    naproxen sodium (ALEVE) 220 mg Cap Take 200 mg by mouth daily. Allergies   Allergen Reactions    Betadine Surgi-Prep Rash    Neomycin Rash     **Patient Denies**      Peroxyl [Hydrogen Peroxide] Rash    Sulfa Dyne Rash         REVIEW OF SYSTEMS    Constitutional: Negative for fever, chills, or weight change. Respiratory: Negative for cough or shortness of breath. Cardiovascular: Negative for chest pain or palpitations. Gastrointestinal: Negative for acid reflux, change in bowel habits, or constipation. Genitourinary: Negative for dysuria and flank pain. Musculoskeletal: Positive for lumbar pain and left thigh weakness. Skin: Negative for rash. Neurological: Negative for headaches, dizziness, or numbness. Endo/Heme/Allergies: Negative for increased bruising. Psychiatric/Behavioral: Negative for difficulty with sleep.       PHYSICAL EXAMINATION  Visit Vitals    /80    Pulse 81    Temp 98.6 °F (37 °C) (Oral)    Resp 16    Ht 5' 9.5\" (1.765 m)    Wt 246 lb (111.6 kg)    SpO2 98%    BMI 35.81 kg/m2 Constitutional: Awake, alert, and in no acute distress. Neurological: 1+ symmetrical DTRs in the lower extremities. Sensation to light touch is intact. Skin: warm, dry, and intact. Musculoskeletal: Tenderness to palpation in the lower lumbar region. Moderate pain with extension and axial loading. No pain with internal or external rotation of her hips. Negative straight leg raise bilaterally. Hip Flex  Quads Hamstrings Ankle DF EHL Ankle PF   Right +4/5 +4/5 +4/5 +4/5 +4/5 +4/5   Left +4/5 +4/5 +4/5 +4/5 +4/5 +4/5     IMAGING:    Lumbar spine MRI from 04/05/2018 was personally reviewed with the patient and demonstrated:    Results from [place] encounter on 04/05/18   MRI LUMB SPINE W WO CONT   Narrative EXAM: Lumbar MRI with and without contrast    CLINICAL INDICATION: Left-sided low back pain    TECHNIQUE: MRI of the lumbar spine with and without contrast obtained. Multiplanar multisequence MR images of the lumbar spine obtained. IV Contrast: 12 cc of Gadavist    COMPARISON: 11/14/2015    FINDINGS: All numbering assumes 5 lumbar type vertebrae. The alignment is  unremarkable. The marrow signal is normal. The cord terminates at L1. No cord  signal abnormalities appreciated. Postoperative changes are noted at L4-L5 and  L5-S1. The abdominal aorta is without evidence of aneurysm. No paraaortic adenopathy  appreciated. The visualized kidneys are unremarkable. L1-L2 level: Minimal degenerative changes are noted. No significant canal or  neural foraminal stenosis appreciated. L2-L3: Facet arthropathy and mild disc bulge is noted. No significant canal or  neural foraminal stenosis. L3-L4: Facet arthropathy and broad-based disc bulge are noted. No severe canal  stenosis or neural foraminal stenosis appreciated. L4-L5: Right paracentral hemilaminectomy is noted. The previously seen disc  protrusion is no longer appreciated. No focus of abnormal enhancement  identified.  A new left foraminal protrusion is noted which causes moderate to  severe left neural foraminal narrowing. The right neural foramen is mildly  narrowed. No significant canal stenosis. L5-S1: Postoperative changes are noted with left hemicolectomy. Facet  arthropathy and central disc protrusion is noted. No severe canal stenosis  appreciated. Mild left and minimal right neural foraminal narrowing are noted. Impression Impression:  1. Postoperative changes at L4-L5 and L5-S1.    2.  New left foraminal disc protrusion is causing moderate to severe left neural  foraminal narrowing at L4-L5.    3. Interval treatment of the previously seen right paracentral disc protrusion  of L4-L5. Written by Catherine Chowdary, as dictated by Solo Pizarro MD.  I, Dr. Solo Pizarro confirm that all documentation is accurate.

## 2018-04-24 ENCOUNTER — OFFICE VISIT (OUTPATIENT)
Dept: ORTHOPEDIC SURGERY | Age: 43
End: 2018-04-24

## 2018-04-24 VITALS
TEMPERATURE: 98.1 F | HEART RATE: 80 BPM | SYSTOLIC BLOOD PRESSURE: 133 MMHG | DIASTOLIC BLOOD PRESSURE: 74 MMHG | WEIGHT: 247 LBS | RESPIRATION RATE: 17 BRPM | OXYGEN SATURATION: 100 % | BODY MASS INDEX: 35.95 KG/M2

## 2018-04-24 DIAGNOSIS — M51.26 HNP (HERNIATED NUCLEUS PULPOSUS), LUMBAR: Primary | ICD-10-CM

## 2018-04-24 NOTE — MR AVS SNAPSHOT
Nba Doyle 
 
 
 [address] [facility] [address] 
[phone] Patient: Santhosh Acosta 
MRN: [id number] FRQ:[id number]/2/[id number] Visit Information Date & Time Provider Department Dept. Phone Encounter #  
 4/24/2018  8:30 AM Kenneth Meredith  [facility] [address] [facility] [phone] [id number] Follow-up Instructions Return in about 4 weeks (around 5/22/2018) for with Dr. Rosa Matias. Follow-up and Disposition History Your Appointments 9/19/2018 11:00 AM  
Follow Up with Nelly Lr MD  
[facility]) Appt Note: 6 mo fu back [address] [facility] [address]  
[phone]  
  
   
 [address] [facility] [address] Upcoming Health Maintenance Date Due Pneumococcal 19-64 Medium Risk (1 of 1 - PPSV23) 7/9/1992 DTaP/Tdap/Td series (1 - Tdap) 7/9/1994 PAP AKA CERVICAL CYTOLOGY 7/9/1994 Influenza Age 5 to Adult 8/1/2017 Allergies as of 4/24/2018  Review Complete On: 4/24/2018 By: Kenneth Meredith MD  
  
 Severity Noted Reaction Type Reactions Betadine Surgi-prep  09/11/2012    Rash Neomycin  09/11/2012    Rash **Patient Denies** Peroxyl [Hydrogen Peroxide]  09/11/2012    Rash  
 Sulfa Dyne  09/11/2012    Rash Current Immunizations  Never Reviewed No immunizations on file. Not reviewed this visit You Were Diagnosed With   
  
 Codes Comments HNP (herniated nucleus pulposus), lumbar    -  Primary ICD-10-CM: M51.26 
ICD-9-CM: 722.10 Vitals BP Pulse Temp Resp Weight(growth percentile) SpO2  
 133/74 (BP 1 Location: Left arm, BP Patient Position: Sitting) 80 98.1 °F (36.7 °C) (Oral) 17 247 lb (112 kg) 100% BMI OB Status Smoking Status 35.95 kg/m2 IUD Current Every Day Smoker BMI and BSA Data Body Mass Index Body Surface Area 35.95 kg/m 2 2.34 m 2 Preferred Pharmacy Pharmacy Name Phone [address] [facility], [address] [place] [address] [phone] Your Updated Medication List  
  
   
This list is accurate as of 4/24/18  9:21 AM.  Always use your most recent med list.  
  
  
  
  
 ALEVE 220 mg Cap Generic drug:  naproxen sodium Take 200 mg by mouth daily. cholecalciferol 1,000 unit Cap Commonly known as:  VITAMIN D3 Take  by mouth. Cyclobenzaprine 15 mg Cp24 SR capsule Commonly known as:  AMRIX  
1-2 po daily as needed for muscle spasm  Indications: Muscle Spasm  
  
 diclofenac EC 75 mg EC tablet Commonly known as:  VOLTAREN Take 1 Tab by mouth two (2) times daily (with meals). escitalopram oxalate 20 mg tablet Commonly known as:  Levorn Allegra Take 20 mg by mouth daily. HYDROcodone-acetaminophen 5-325 mg per tablet Commonly known as:  Omer Kaylah 1 po tid as needed for severe pain  
  
 oxyCODONE-acetaminophen  mg per tablet Commonly known as:  PERCOCET 1 po q pm prn severe pain WELLBUTRIN  mg XL tablet Generic drug:  buPROPion XL Take 300 mg by mouth every morning. We Performed the Following REFERRAL TO PHYSICAL THERAPY [ZWA40 Custom] Follow-up Instructions Return in about 4 weeks (around 5/22/2018) for with Dr. Conner Romero. Referral Information Referral ID Referred By Referred To  
  
 [id number] MIKAEL [address] [place] [address] [place] [facility] [address] [place] [address] Phone: [phone] Visits Status Start Date End Date 1 New Request 4/24/18 4/24/19 If your referral has a status of pending review or denied, additional information will be sent to support the outcome of this decision. Patient Instructions Herniated Disc: Care Instructions Your Care Instructions The bones that form the spine in your back are cushioned by small discs. If a disc is damaged, it may bulge or break open (herniate). A herniated disc can result from normal wear and tear as we age or from an injury or disease. If a herniated disc presses on a nerve, it can cause pain and numbness in your leg (sciatica) and/or back pain. You may be able to heal your herniated disc with a few weeks or months of rest, medicine, and exercises. In some cases, you may need surgery. Follow-up care is a key part of your treatment and safety. Be sure to make and go to all appointments, and call your doctor if you are having problems. It's also a good idea to know your test results and keep a list of the medicines you take. How can you care for yourself at home? · Take your medicines exactly as prescribed. Call your doctor if you think you are having a problem with your medicine. · Ask your doctor if you can take an over-the-counter pain medicine, such as acetaminophen (Tylenol), ibuprofen (Advil, Motrin), or naproxen (Aleve). Read and follow all instructions on the label. · Do not take two or more pain medicines at the same time unless the doctor told you to. Many pain medicines have acetaminophen, which is Tylenol. Too much acetaminophen (Tylenol) can be harmful. · Rest your back if your pain is severe. · Avoid movements and positions that increase your pain or numbness. · Try taking short walks and doing light activities that do not cause pain. Even if you are feeling some pain, it is important to keep your muscles active and strong. · Use heat or ice to relieve pain. ¨ To apply heat, put a warm water bottle, heating pad set on low, or warm cloth on your back. Do not go to sleep with a heating pad on your skin. ¨ To use ice, put ice or a cold pack on the area for 10 to 20 minutes at a time. Put a thin cloth between the ice and your skin. · Your doctor may recommend a physical therapy program, where you learn exercises to do at home.  These exercises strengthen the muscles that support your lower back and prevent reinjury. · Stay at a healthy weight. This may reduce the load on your back. · Quit smoking if you smoke. If you need help quitting, talk to your doctor about stop-smoking programs and medicines. These can increase your chances of quitting for good. · To avoid hurting your back when lifting: ¨ Lift with your legs, not your back, by squatting and bending your knees. Avoid bending forward at the waist when lifting. ¨ Rise slowly. ¨ Keep the load as close to your body as possible, at the level of your navel. ¨ Avoid turning or twisting your body while holding a heavy object. ¨ Get help if you need to lift a heavy object. Never lift a heavy object above shoulder level. When should you call for help? Call 911 anytime you think you may need emergency care. For example, call if: 
? · You are unable to move a leg at all. ?Call your doctor now or seek immediate medical care if: 
? · You have new or worse symptoms in your arms, legs, chest, belly, or buttocks. Symptoms may include: ¨ Numbness or tingling. ¨ Weakness. ¨ Pain. ? · You lose bladder or bowel control. ? Watch closely for changes in your health, and be sure to contact your doctor if: 
? · You are not getting better as expected. Where can you learn more? Go to http://mona-rajni.info/. Enter F534 in the search box to learn more about \"Herniated Disc: Care Instructions. \" Current as of: March 21, 2017 Content Version: 11.4 © 6655-9406 [employer]. Care instructions adapted under license by [employer] (which disclaims liability or warranty for this information). If you have questions about a medical condition or this instruction, always ask your healthcare professional. [place] [address] any warranty or liability for your use of this information. Herniated Disc: Exercises Your Care Instructions Here are some examples of typical rehabilitation exercises for your condition. Start each exercise slowly. Ease off the exercise if you start to have pain. Your doctor or physical therapist will tell you when you can start these exercises and which ones will work best for you. Back Exercises These exercises can help you move easier and feel better. But when you first start doing them, you may have more pain in your back. This is normal. But it is important to pay close attention to your pain during and after each exercise. · Keep doing these exercises if your pain stays the same or moves from your leg and buttock more toward the middle of your spine. Pain moving out of your leg and buttock is a good sign. · Stop doing these exercises if your pain gets worse in your leg and buttock. Stop if you start to have pain in your leg and buttock that you didn't have before. Be sure to do these exercises in the order they appear. Note how your pain changes before you move to the next one. If your pain is much worse right after exercise and stays worse the next day, do not do any of these exercises. How to do the exercises 1. Rest on belly 1. Lie on your stomach, face down, with your head turned to the side. Place your arms beside your body. If this bothers your neck, place your hands, one on top of the other, underneath your forehead. This will help support your head and neck. 2. Try to relax your lower back muscles as much as you can. 3. Continue to lie on your stomach for 2 minutes. 4. If your pain spreads down your leg or increases down your leg, stop this exercise and do not do the next exercises. 2. Press-up 1. Lie on your stomach, face down. Keep your elbows tucked into your sides and under your shoulders. 2. Press your elbows down into the floor to raise your upper back. As you do this, relax your stomach muscles.  Allow your back to arch without using your back muscles. Let your low back relax completely as you arch up. 3. Hold this position for 2 minutes. 4. Repeat 2 to 4 times. 5. If your pain spreads down your leg or increases down your leg, stop this exercise and do not do the next exercises. 3. Full press-up 1. Lie on your stomach, face down. Keep your elbows tucked into your sides and under your shoulders. 2. Straighten your elbows, and push your upper body up as far as you can. Allow your lower back to sag. Keep your hips, pelvis, and legs relaxed. 3. Hold this position for 5 seconds, and then relax. 4. Repeat 10 times. Each time, try to raise your upper body a little higher and hold your arms a bit straighter. 5. If your pain spreads down your leg or gets worse down your leg, stop this exercise and do not move to the next exercise. 6. If you can't do this exercise, you may instead try the backward bend exercise that follows. 4. Backward bend 1. Stand with your feet hip-width apart. Your toes should point forward. Do not lock your knees. 2. Place your hands in the small of your back. 3. Bend backward as far as you can, keeping your knees straight. Hold this position for 2 to 3 seconds. Then return to your starting position. 4. Repeat 2 to 4 times. Each time, try to bend backward a little farther, until you bend backward as far as you can. 5. If your pain spreads down your leg or increases down your leg, stop this exercise. Follow-up care is a key part of your treatment and safety. Be sure to make and go to all appointments, and call your doctor if you are having problems. It's also a good idea to know your test results and keep a list of the medicines you take. Where can you learn more? Go to http://mona-rajni.info/. Enter [phone] in the search box to learn more about \"Herniated Disc: Exercises. \" Current as of: March 21, 2017 Content Version: 11.4 © 8534-6063 [employer] 2000[employer]. Care instructions adapted under license by [employer] (which disclaims liability or warranty for this information). If you have questions about a medical condition or this instruction, always ask your healthcare professional. [address] any warranty or liability for your use of this information. Introducing [facility]! [facility] introduces MoneyExpert patient portal. Now you can access parts of your medical record, email your doctor's office, and request medication refills online. 1. In your internet browser, go to [email or web address] 2. Click on the First Time User? Click Here link in the Sign In box. You will see the New Member Sign Up page. 3. Enter your MoneyExpert Access Code exactly as it appears below. You will not need to use this code after youve completed the sign-up process. If you do not sign up before the expiration date, you must request a new code. · MoneyExpert Access Code: [email or web address] Expires: 6/19/2018 11:44 AM 
 
4. Enter the last four digits of your Social Security Number (xxxx) and Date of Birth (mm/dd/yyyy) as indicated and click Submit. You will be taken to the next sign-up page. 5. Create a MoneyExpert ID. This will be your MoneyExpert login ID and cannot be changed, so think of one that is secure and easy to remember. 6. Create a MoneyExpert password. You can change your password at any time. 7. Enter your Password Reset Question and Answer. This can be used at a later time if you forget your password. 8. Enter your e-mail address. You will receive e-mail notification when new information is available in [address] 9. Click Sign Up. You can now view and download portions of your medical record. 10. Click the Download Summary menu link to download a portable copy of your medical information.  
 
If you have questions, please visit the Frequently Asked Questions section of the Live[employer]. Remember, [employer] is NOT to be used for urgent needs. For medical emergencies, dial 911. Now available from your iPhone and Android! Please provide this summary of care documentation to your next provider. Your primary care clinician is listed as [place]. If you have any questions after today's visit, please call [phone].

## 2018-04-24 NOTE — PROGRESS NOTES
[address]  [address] [facility],[address]  [address] [facility] [address]  Phone: [phone]  Fax: [phone]  INITIAL CONSULTATION  Patient: Robel Soares                MRN: [id number]       SSN: [id number]  YOB: 1975        AGE: 38 y.o. SEX: female  Body mass index is 35.95 kg/(m^2). PCP: Shelley Pineda MD  04/24/18    Chief Complaint   Patient presents with    Back Pain     SC         HISTORY OF PRESENT ILLNESS, RADIOGRAPHS, and PLAN:         HISTORY OF PRESENT ILLNESS:  Ms. Stacey De La O is seen today at the request of Dr. Will Infante and Dr. Tania Vasquez. Ms. Stacey De La O is well-known to me. She is a 67-year-old female who I performed surgery on maybe 20 years ago, as well as back in 2016. Twenty years ago, she had a left L5-S1 laminotomy and discectomy. Two years ago, she had a right-sided L4-5 laminotomy and discectomy, both with good results. About three or four weeks ago, she began to have some severe, left-sided radiculopathy. She was seen by Dr. Gerber. Studies disclose a lateral, far-lateral disc herniation at L4-5. Her pain has dissipated, though she has a partial foot drop on the left. She rates her pain as a 2/10 or a 3/10. She denies bowel or bladder dysfunction, fever, chills, or night sweats, weight loss or weight gain. PHYSICAL EXAMINATION:  Her exam demonstrates a mild straight leg raise with 3/5 strength of her tib/ant and a normal EHL. There is subtle numbness. RADIOGRAPHS:  Her radiographs demonstrate a foraminal, far-lateral disc herniation. There may extruded fragment far-laterally. Resolution of a previous right-sided L4-5 disc herniation and resolution of her 19year-old L5-S1 disc herniation, and degenerative changes at both levels. There are degenerative changes elsewhere in her spine and no instability. ASSESSMENT/PLAN: I discussed the matter at length with her.   Given that she is only four weeks into the syndrome and that her pain has greatly resolved, but she has moderate weakness in a difficult location for disc pathology, I would recommend a conservative course of treatment with primarily physical therapy geared at strengthening her tibialis anterior and ongoing core strengthening and stretching program.  Further surgery at this L4-5 segment is difficult given both its interforaminal and far-lateral location, and I am concerned about her possible need for a fusion intervention. She also has had surgery at two levels in her lumbar spine, L5-S1 20 years ago and now L4-5 two years ago, and a fusion, at this point, I think would be concerning. My recommendation at this point would be conservative care. With rehabilitation, a strengthening program, and just observation, hopefully, we can get through this with continued improvement. I think there is little to be gained by a decompression and possible fusion at L4-5, and it is always somewhat treacherous dealing with far-lateral disc pathologies. I will see her back in four weeks with continued therapy and exercise. She is continuing to improve. cc: Heriberto Kilpatrick M.D. Past Medical History:   Diagnosis Date    Anger     Anxiety     Chronic pain     back pain    GERD (gastroesophageal reflux disease)     Psychiatric disorder     depression    Seizures (HCC)     Encephalitis, seizures only as a child, none since       Family History   Problem Relation Age of Onset    Cancer Father     Drug Abuse Sister     No Known Problems Brother        Current Outpatient Prescriptions   Medication Sig Dispense Refill    Cyclobenzaprine (AMRIX) 15 mg cp24 SR capsule 1-2 po daily as needed for muscle spasm  Indications: Muscle Spasm 60 Cap 4    diclofenac EC (VOLTAREN) 75 mg EC tablet Take 1 Tab by mouth two (2) times daily (with meals). 180 Tab 1    buPROPion XL (WELLBUTRIN XL) 300 mg XL tablet Take 300 mg by mouth every morning.       escitalopram oxalate (LEXAPRO) 20 mg tablet Take 20 mg by mouth daily. 0    Cholecalciferol, Vitamin D3, 1,000 unit cap Take  by mouth.  naproxen sodium (ALEVE) 220 mg Cap Take 200 mg by mouth daily.  HYDROcodone-acetaminophen (NORCO) 5-325 mg per tablet 1 po tid as needed for severe pain 21 Tab 0    oxyCODONE-acetaminophen (PERCOCET)  mg per tablet 1 po q pm prn severe pain 30 Tab 0       Allergies   Allergen Reactions    Betadine Surgi-Prep Rash    Neomycin Rash     **Patient Denies**      Peroxyl [Hydrogen Peroxide] Rash    Sulfa Dyne Rash       Past Surgical History:   Procedure Laterality Date    HX BREAST REDUCTION Bilateral 2008    HX GYN      laparascopy    HX LUMBAR LAMINECTOMY  1998    L5    HX LUMBAR LAMINECTOMY  02/24/2016    L4    HX OTHER SURGICAL      abdominoplasty    HX TONSILLECTOMY  age 3       Past Medical History:   Diagnosis Date    Anger     Anxiety     Chronic pain     back pain    GERD (gastroesophageal reflux disease)     Psychiatric disorder     depression    Seizures (HCC)     Encephalitis, seizures only as a child, none since       Social History     Social History    Marital status:      Spouse name: N/A    Number of children: N/A    Years of education: N/A     Occupational History    Not on file. Social History Main Topics    Smoking status: Current Every Day Smoker     Packs/day: 1.00     Years: 3.00     Last attempt to quit: 3/25/2005    Smokeless tobacco: Never Used    Alcohol use Yes      Comment: rarely    Drug use: No    Sexual activity: Not on file      Comment: Pregnancy test negative     Other Topics Concern    Not on file     Social History Narrative           REVIEW OF SYSTEMS:   CONSTITUTIONAL SYMPTOMS:  Negative. EYES:  Negative. EARS, NOSE, THROAT AND MOUTH:  Negative. CARDIOVASCULAR:  Negative. RESPIRATORY:  Negative. GENITOURINARY: Per HPI. GASTROINTESTINAL:  Per HPI.    INTEGUMENTARY (SKIN AND/OR BREAST): Negative. MUSCULOSKELETAL: Per HPI.   ENDOCRINE/RHEUMATOLOGIC:  Negative. NEUROLOGICAL:  Per HPI. HEMATOLOGIC/LYMPHATIC:  Negative. ALLERGIC/IMMUNOLOGIC:  Negative. PSYCHIATRIC:  Negative. PHYSICAL EXAMINATION:   Visit Vitals    /74 (BP 1 Location: Left arm, BP Patient Position: Sitting)    Pulse 80    Temp 98.1 °F (36.7 °C) (Oral)    Resp 17    Wt 247 lb (112 kg)    SpO2 100%    BMI 35.95 kg/m2    PAIN SCALE: 3/10    CONSTITUTIONAL: The patient is in no apparent distress and is alert and oriented x 3. HEENT: Normocephalic. Hearing grossly intact. NECK: Supple and symmetric. no tenderness, or masses were felt. RESPIRATORY: No labored breathing. CARDIOVASCULAR: The carotid pulses were normal. Peripheral pulses were 2+. CHEST: Normal AP diameter and normal contour without any kyphoscoliosis. LYMPHATIC: No lymphadenopathy was appreciated in the neck, axillae or groin. SKIN:  Negative for scars, rashes, lesions, or ulcers on the right upper, right lower, left upper, left lower and trunk. NEUROLOGICAL: Alert and oriented x 3. Ambulation without assistive device. FWB. EXTREMITIES:  See musculoskeletal.  MUSCULOSKELETAL:   Head and Neck:  Negative for misalignment, asymmetry, crepitation, defects, tenderness masses or effusions.  Left Upper Extremity: Inspection, percussion and palpation preformed. Laurents sign is negative.  Right Upper Extremity: Inspection, percussion and palpation preformed. Laurents sign is negative.  Spine, Ribs and Pelvis: Back pain with radiating LLE pain. Inspection, percussion and palpation preformed. Negative for misalignment, asymmetry, crepitation, defects, tenderness masses or effusions.  Left Lower Extremity: Radiating pain. Weakness tib-ant. Foot drop. Paresthesias. Inspection, percussion and palpation preformed. Negative straight leg raise.  Right Lower Extremity: Inspection, percussion and palpation preformed.   Negative straight leg raise. SPINE EXAM:     Lumbar spine: No rash, ecchymosis, or gross obliquity. No focal atrophy is noted. ASSESSMENT    ICD-10-CM ICD-9-CM    1. HNP (herniated nucleus pulposus), lumbar M51.26 722.10 REFERRAL TO PHYSICAL THERAPY       Written by Shabbir Connolly, as dictated by Ulices Brumfield MD.    I, Dr. Ulices Brumfield MD, confirm that all documentation is accurate.

## 2018-05-22 ENCOUNTER — OFFICE VISIT (OUTPATIENT)
Dept: ORTHOPEDIC SURGERY | Age: 43
End: 2018-05-22

## 2018-05-22 VITALS
BODY MASS INDEX: 35.79 KG/M2 | HEART RATE: 76 BPM | OXYGEN SATURATION: 98 % | DIASTOLIC BLOOD PRESSURE: 69 MMHG | HEIGHT: 70 IN | TEMPERATURE: 98.1 F | RESPIRATION RATE: 18 BRPM | WEIGHT: 250 LBS | SYSTOLIC BLOOD PRESSURE: 137 MMHG

## 2018-05-22 DIAGNOSIS — M62.838 MUSCLE SPASM: ICD-10-CM

## 2018-05-22 DIAGNOSIS — M51.26 HNP (HERNIATED NUCLEUS PULPOSUS), LUMBAR: Primary | ICD-10-CM

## 2018-05-22 RX ORDER — CYCLOBENZAPRINE HCL 10 MG
10 TABLET ORAL
Qty: 60 TAB | Refills: 3 | Status: SHIPPED | OUTPATIENT
Start: 2018-05-22 | End: 2018-12-05 | Stop reason: SDUPTHER

## 2018-05-22 NOTE — PATIENT INSTRUCTIONS
Back Care and Preventing Injuries: Care Instructions  Your Care Instructions    You can hurt your back doing many everyday activities: lifting a heavy box, bending down to garden, exercising at the gym, and even getting out of bed. But you can keep your back strong and healthy by doing some exercises. You also can follow a few tips for sitting, sleeping, and lifting to avoid hurting your back again. Talk to your doctor before you start an exercise program. Ask for help if you want to learn more about keeping your back healthy. Follow-up care is a key part of your treatment and safety. Be sure to make and go to all appointments, and call your doctor if you are having problems. It's also a good idea to know your test results and keep a list of the medicines you take. How can you care for yourself at home? · Stay at a healthy weight to avoid strain on your lower back. · Do not smoke. Smoking increases the risk of osteoporosis, which weakens the spine. If you need help quitting, talk to your doctor about stop-smoking programs and medicines. These can increase your chances of quitting for good. · Make sure you sleep in a position that maintains your back's normal curves and on a mattress that feels comfortable. Sleep on your side with a pillow between your knees, or sleep on your back with a pillow under your knees. These positions can reduce strain on your back. · When you get out of bed, lie on your side and bend both knees. Drop your feet over the edge of the bed as you push up with both arms. Scoot to the edge of the bed. Make sure your feet are in line with your rear end (buttocks), and then stand up. · If you must stand for a long time, put one foot on a stool, ledge, or box. Exercise to strengthen your back and other muscles  · Get at least 30 minutes of exercise on most days of the week. Walking is a good choice.  You also may want to do other activities, such as running, swimming, cycling, or playing tennis or team sports. · Stretch your back muscles. Here are few exercises to try:  Gamal Batista on your back with your knees bent and your feet flat on the floor. Gently pull one bent knee to your chest. Put that foot back on the floor, and then pull the other knee to your chest. Hold for 15 to 30 seconds. Repeat 2 to 4 times. ¨ Do pelvic tilts. Lie on your back with your knees bent. Tighten your stomach muscles. Pull your belly button (navel) in and up toward your ribs. You should feel like your back is pressing to the floor and your hips and pelvis are slightly lifting off the floor. Hold for 6 seconds while breathing smoothly. · Keep your core muscles strong. The muscles of your back, belly (abdomen), and buttocks support your spine. ¨ Pull in your belly, and imagine pulling your navel toward your spine. Hold this for 6 seconds, then relax. Remember to keep breathing normally as you tense your muscles. ¨ Do curl-ups. Always do them with your knees bent. Keep your low back on the floor, and curl your shoulders toward your knees using a smooth, slow motion. Keep your arms folded across your chest. If this bothers your neck, try putting your hands behind your neck (not your head), with your elbows spread apart. ¨ Lie on your back with your knees bent and your feet flat on the floor. Tighten your belly muscles, and then push with your feet and raise your buttocks up a few inches. Hold this position 6 seconds as you continue to breathe normally, then lower yourself slowly to the floor. Repeat 8 to 12 times. ¨ If you like group exercise, try Pilates or yoga. These classes have poses that strengthen the core muscles. Protect your back when you sit  · Place a small pillow, a rolled-up towel, or a lumbar roll in the curve of your back if you need extra support. · Sit in a chair that is low enough to let you place both feet flat on the floor with both knees nearly level with your hips.  If your chair or desk is too high, use a foot rest to raise your knees. · When driving, keep your knees nearly level with your hips. Sit straight, and drive with both hands on the steering wheel. Your arms should be in a slightly bent position. · Try a kneeling chair, which helps tilt your hips forward. This takes pressure off your lower back. · Try sitting on an exercise ball. It can rock from side to side, which helps keep your back loose. Lift properly  · Squat down, bending at the hips and knees only. If you need to, put one knee to the floor and extend your other knee in front of you, bent at a right angle (half kneeling). · Press your chest straight forward. This helps keep your upper back straight while keeping a slight arch in your low back. · Hold the load as close to your body as possible, at the level of your navel. · Use your feet to change direction, taking small steps. · Lead with your hips as you change direction. Keep your shoulders in line with your hips as you move. Do not twist your body. · Set down your load carefully, squatting with your knees and hips only. When should you call for help? Watch closely for changes in your health, and be sure to contact your doctor if you have any problems. Where can you learn more? Go to http://mona-rajni.info/. Enter S810 in the search box to learn more about \"Back Care and Preventing Injuries: Care Instructions. \"  Current as of: March 21, 2017  Content Version: 11.4  © 1327-3953 [employer]. Care instructions adapted under license by [employer] (which disclaims liability or warranty for this information). If you have questions about a medical condition or this instruction, always ask your healthcare professional. [place] [address] any warranty or liability for your use of this information.

## 2018-05-22 NOTE — PROGRESS NOTES
[address], [address]  [place], [address] [place] [address] Street  Phone: [phone]  Fax: [phone]  PROGRESS NOTE  Patient: Griselda Kind                MRN: [id number]       SSN: [id number]  YOB: 1975        AGE: 38 y.o. SEX: female  Body mass index is 36.39 kg/(m^2). PCP: Louis Rabago MD  05/22/18    Chief Complaint   Patient presents with    Back Pain     f/u       HISTORY OF PRESENT ILLNESS, RADIOGRAPHS, and PLAN:     HISTORY:  Ms. Zohreh Tamayo returns today. Her pain is a 2-3/10. She still has a mild foot drop on that left-hand side. She saw Dr. Luis E Bae for a second opinion, who concurred with me that it would be best to try to avoid surgery at this time and continue conservative care. She has this interforaminal far lateral disc herniation. This is a recurrent disc herniation. This would be her third. Dr. Lars Wynn evidently opined that she may try another decompression of that level. I would tend to consider a fusion given so many surgeries and the location of the recurrent disc and discussed that with the patient. ASSESSMENT/PLAN: It certainly is a difficult situation, and I would try to avoid surgery if at all possible, and that is what I impressed upon the patient. She needs to do therapy, which really has not commenced yet. We have refilled her Flexeril. I will see her back in six weeks time.       cc: Louis Rabago MD         Past Medical History:   Diagnosis Date    Anger     Anxiety     Chronic pain     back pain    GERD (gastroesophageal reflux disease)     Psychiatric disorder     depression    Seizures (HCC)     Encephalitis, seizures only as a child, none since       Family History   Problem Relation Age of Onset    Cancer Father     Drug Abuse Sister     No Known Problems Brother        Current Outpatient Prescriptions   Medication Sig Dispense Refill    Cyclobenzaprine (AMRIX) 15 mg cp24 SR capsule 1-2 po daily as needed for muscle spasm  Indications: Muscle Spasm 60 Cap 4    diclofenac EC (VOLTAREN) 75 mg EC tablet Take 1 Tab by mouth two (2) times daily (with meals). 180 Tab 1    buPROPion XL (WELLBUTRIN XL) 300 mg XL tablet Take 300 mg by mouth every morning.  escitalopram oxalate (LEXAPRO) 20 mg tablet Take 20 mg by mouth daily. 0    Cholecalciferol, Vitamin D3, 1,000 unit cap Take  by mouth.  HYDROcodone-acetaminophen (NORCO) 5-325 mg per tablet 1 po tid as needed for severe pain 21 Tab 0       Allergies   Allergen Reactions    Betadine Surgi-Prep Rash    Neomycin Rash     **Patient Denies**      Peroxyl [Hydrogen Peroxide] Rash    Sulfa Dyne Rash       Past Surgical History:   Procedure Laterality Date    HX BREAST REDUCTION Bilateral 2008    HX GYN      laparascopy    HX LUMBAR LAMINECTOMY  1998    L5    HX LUMBAR LAMINECTOMY  02/24/2016    L4    HX OTHER SURGICAL      abdominoplasty    HX TONSILLECTOMY  age 3       Past Medical History:   Diagnosis Date    Anger     Anxiety     Chronic pain     back pain    GERD (gastroesophageal reflux disease)     Psychiatric disorder     depression    Seizures (HCC)     Encephalitis, seizures only as a child, none since       Social History     Social History    Marital status:      Spouse name: N/A    Number of children: N/A    Years of education: N/A     Occupational History    Not on file. Social History Main Topics    Smoking status: Current Every Day Smoker     Packs/day: 1.00     Years: 3.00     Last attempt to quit: 3/25/2005    Smokeless tobacco: Never Used    Alcohol use Yes      Comment: rarely    Drug use: No    Sexual activity: Not on file      Comment: Pregnancy test negative     Other Topics Concern    Not on file     Social History Narrative         REVIEW OF SYSTEMS:   CONSTITUTIONAL SYMPTOMS:  Negative. EYES:  Negative. EARS, NOSE, THROAT AND MOUTH:  Negative.    CARDIOVASCULAR: Negative. RESPIRATORY:  Negative. GENITOURINARY: Per HPI. GASTROINTESTINAL:  Per HPI. INTEGUMENTARY (SKIN AND/OR BREAST):  Negative. MUSCULOSKELETAL: Per HPI.   ENDOCRINE/RHEUMATOLOGIC:  Negative. NEUROLOGICAL:  Per HPI. HEMATOLOGIC/LYMPHATIC:  Negative. ALLERGIC/IMMUNOLOGIC:  Negative. PSYCHIATRIC:  Negative. PHYSICAL EXAMINATION:   Visit Vitals    /69    Pulse 76    Temp 98.1 °F (36.7 °C)    Resp 18    Ht 5' 9.5\" (1.765 m)    Wt 250 lb (113.4 kg)    SpO2 98%    BMI 36.39 kg/m2    PAIN SCALE: 3/10    CONSTITUTIONAL: The patient is in no apparent distress and is alert and oriented x 3. HEENT: Normocephalic. Hearing grossly intact. NECK: Supple and symmetric. no tenderness, or masses were felt. RESPIRATORY: No labored breathing. CARDIOVASCULAR: The carotid pulses were normal. Peripheral pulses were 2+. CHEST: Normal AP diameter and normal contour without any kyphoscoliosis. LYMPHATIC: No lymphadenopathy was appreciated in the neck, axillae or groin. SKIN:  Negative for scars, rashes, lesions, or ulcers on the right upper, right lower, left upper, left lower and trunk. NEUROLOGICAL: Alert and oriented x 3. Ambulation without assistive device. FWB. EXTREMITIES: See musculoskeletal.  MUSCULOSKELETAL:   Head and Neck:  Negative for misalignment, asymmetry, crepitation, defects, tenderness masses or effusions.  Left Upper Extremity: Inspection, percussion and palpation preformed. Laurents sign is negative.  Right Upper Extremity: Inspection, percussion and palpation preformed. Laurents sign is negative.  Spine, Ribs and Pelvis: Back pain with radiating LLE pain. Inspection, percussion and palpation preformed. Negative for misalignment, asymmetry, crepitation, defects, tenderness masses or effusions.  Left Lower Extremity: Radiating pain. Inspection, percussion and palpation preformed. Negative straight leg raise.    Right Lower Extremity: Inspection, percussion and palpation preformed. Negative straight leg raise. SPINE EXAM:       Lumbar spine: No rash, ecchymosis, or gross obliquity. No focal atrophy is noted. ASSESSMENT    ICD-10-CM ICD-9-CM    1. HNP (herniated nucleus pulposus), lumbar M51.26 722.10    2. Muscle spasm M62.838 728.85 cyclobenzaprine (FLEXERIL) 10 mg tablet       Written by Yessy Shape, as dictated by Clyde Rainey MD.    I, Dr. Clyde Rainey MD, confirm that all documentation is accurate.

## 2018-05-22 NOTE — MR AVS SNAPSHOT
[address] [facility] [place] [address] [phone] Patient: Noemy Taveras 
MRN: [id number] BIP:[birth year] Visit Information Date & Time Provider Department Dept. Phone Encounter #  
 5/22/2018  8:15 AM Radha Pulliam MD [address] [place], [place] [address] [phone] [id number] Follow-up Instructions Return in about 6 weeks (around 7/3/2018) for with Dr. Cresencio Corral. Follow-up and Disposition History Your Appointments 9/19/2018 11:00 AM  
Follow Up with Franck Wise MD  
[facility] [address] [facility]) Appt Note: 6 mo fu back [address] [facility] [address]  
[phone]  
  
   
 [address] [facility] [place] [address] Upcoming Health Maintenance Date Due Pneumococcal 19-64 Medium Risk (1 of 1 - PPSV23) 7/9/1992 DTaP/Tdap/Td series (1 - Tdap) 7/9/1994 PAP AKA CERVICAL CYTOLOGY 7/9/1994 Influenza Age 5 to Adult 8/1/2018 Allergies as of 5/22/2018  Review Complete On: 5/22/2018 By: Radha Pulliam MD  
  
 Severity Noted Reaction Type Reactions Betadine Surgi-prep  09/11/2012    Rash Neomycin  09/11/2012    Rash **Patient Denies** Peroxyl [Hydrogen Peroxide]  09/11/2012    Rash  
 Sulfa Dyne  09/11/2012    Rash Current Immunizations  Never Reviewed No immunizations on file. Not reviewed this visit You Were Diagnosed With   
  
 Codes Comments HNP (herniated nucleus pulposus), lumbar    -  Primary ICD-10-CM: M51.26 
ICD-9-CM: 722.10 Muscle spasm     ICD-10-CM: J90.654 ICD-9-CM: 728.85 Vitals BP Pulse Temp Resp Height(growth percentile) Weight(growth percentile)  
 137/69 76 98.1 °F (36.7 °C) 18 5' 9.5\" (1.765 m) 250 lb (113.4 kg) SpO2 BMI OB Status Smoking Status 98% 36.39 kg/m2 IUD Current Every Day Smoker BMI and BSA Data Body Mass Index Body Surface Area  
 36.39 kg/m 2 2.36 m 2 Preferred Pharmacy Pharmacy Name Phone [facility] , [address] [place] [address] [phone] Your Updated Medication List  
  
   
This list is accurate as of 5/22/18  9:05 AM.  Always use your most recent med list.  
  
  
  
  
 cholecalciferol 1,000 unit Cap Commonly known as:  VITAMIN D3 Take  by mouth. * Cyclobenzaprine 15 mg Cp24 SR capsule Commonly known as:  AMRIX  
1-2 po daily as needed for muscle spasm  Indications: Muscle Spasm * cyclobenzaprine 10 mg tablet Commonly known as:  FLEXERIL Take 1 Tab by mouth three (3) times daily as needed for Muscle Spasm(s). diclofenac EC 75 mg EC tablet Commonly known as:  VOLTAREN Take 1 Tab by mouth two (2) times daily (with meals). escitalopram oxalate 20 mg tablet Commonly known as:  Omero Orts Take 20 mg by mouth daily. HYDROcodone-acetaminophen 5-325 mg per tablet Commonly known as:  Tato Pare 1 po tid as needed for severe pain WELLBUTRIN  mg XL tablet Generic drug:  buPROPion XL Take 300 mg by mouth every morning. * Notice: This list has 2 medication(s) that are the same as other medications prescribed for you. Read the directions carefully, and ask your doctor or other care provider to review them with you. Prescriptions Sent to Pharmacy Refills  
 cyclobenzaprine (FLEXERIL) 10 mg tablet 3 Sig: Take 1 Tab by mouth three (3) times daily as needed for Muscle Spasm(s). Class: Normal  
 Pharmacy: [address] , [address] [place] [address] Ph #: [phone] Route: Oral  
  
We Performed the Following REFERRAL TO PHYSICAL THERAPY [PAL14 Custom] Follow-up Instructions Return in about 6 weeks (around 7/3/2018) for with Dr. Vicki Lagunas. Referral Information Referral ID Referred By Referred To  
  
 [id number] Eugenia DUVAL Not Available Visits Status Start Date End Date 1 New Request 5/22/18 5/22/19 If your referral has a status of pending review or denied, additional information will be sent to support the outcome of this decision. Patient Instructions Back Care and Preventing Injuries: Care Instructions Your Care Instructions You can hurt your back doing many everyday activities: lifting a heavy box, bending down to garden, exercising at the gym, and even getting out of bed. But you can keep your back strong and healthy by doing some exercises. You also can follow a few tips for sitting, sleeping, and lifting to avoid hurting your back again. Talk to your doctor before you start an exercise program. Ask for help if you want to learn more about keeping your back healthy. Follow-up care is a key part of your treatment and safety. Be sure to make and go to all appointments, and call your doctor if you are having problems. It's also a good idea to know your test results and keep a list of the medicines you take. How can you care for yourself at home? · Stay at a healthy weight to avoid strain on your lower back. · Do not smoke. Smoking increases the risk of osteoporosis, which weakens the spine. If you need help quitting, talk to your doctor about stop-smoking programs and medicines. These can increase your chances of quitting for good. · Make sure you sleep in a position that maintains your back's normal curves and on a mattress that feels comfortable. Sleep on your side with a pillow between your knees, or sleep on your back with a pillow under your knees. These positions can reduce strain on your back. · When you get out of bed, lie on your side and bend both knees. Drop your feet over the edge of the bed as you push up with both arms. Scoot to the edge of the bed. Make sure your feet are in line with your rear end (buttocks), and then stand up. · If you must stand for a long time, put one foot on a stool, ledge, or box. Exercise to strengthen your back and other muscles · Get at least 30 minutes of exercise on most days of the week. Walking is a good choice. You also may want to do other activities, such as running, swimming, cycling, or playing tennis or team sports. · Stretch your back muscles. Here are few exercises to try: ¨ Lie on your back with your knees bent and your feet flat on the floor. Gently pull one bent knee to your chest. Put that foot back on the floor, and then pull the other knee to your chest. Hold for 15 to 30 seconds. Repeat 2 to 4 times. ¨ Do pelvic tilts. Lie on your back with your knees bent. Tighten your stomach muscles. Pull your belly button (navel) in and up toward your ribs. You should feel like your back is pressing to the floor and your hips and pelvis are slightly lifting off the floor. Hold for 6 seconds while breathing smoothly. · Keep your core muscles strong. The muscles of your back, belly (abdomen), and buttocks support your spine. ¨ Pull in your belly, and imagine pulling your navel toward your spine. Hold this for 6 seconds, then relax. Remember to keep breathing normally as you tense your muscles. ¨ Do curl-ups. Always do them with your knees bent. Keep your low back on the floor, and curl your shoulders toward your knees using a smooth, slow motion. Keep your arms folded across your chest. If this bothers your neck, try putting your hands behind your neck (not your head), with your elbows spread apart. ¨ Lie on your back with your knees bent and your feet flat on the floor. Tighten your belly muscles, and then push with your feet and raise your buttocks up a few inches. Hold this position 6 seconds as you continue to breathe normally, then lower yourself slowly to the floor. Repeat 8 to 12 times. ¨ If you like group exercise, try Pilates or yoga.  These classes have poses that strengthen the core muscles. Protect your back when you sit · Place a small pillow, a rolled-up towel, or a lumbar roll in the curve of your back if you need extra support. · Sit in a chair that is low enough to let you place both feet flat on the floor with both knees nearly level with your hips. If your chair or desk is too high, use a foot rest to raise your knees. · When driving, keep your knees nearly level with your hips. Sit straight, and drive with both hands on the steering wheel. Your arms should be in a slightly bent position. · Try a kneeling chair, which helps tilt your hips forward. This takes pressure off your lower back. · Try sitting on an exercise ball. It can rock from side to side, which helps keep your back loose. Lift properly · Squat down, bending at the hips and knees only. If you need to, put one knee to the floor and extend your other knee in front of you, bent at a right angle (half kneeling). · Press your chest straight forward. This helps keep your upper back straight while keeping a slight arch in your low back. · Hold the load as close to your body as possible, at the level of your navel. · Use your feet to change direction, taking small steps. · Lead with your hips as you change direction. Keep your shoulders in line with your hips as you move. Do not twist your body. · Set down your load carefully, squatting with your knees and hips only. When should you call for help? Watch closely for changes in your health, and be sure to contact your doctor if you have any problems. Where can you learn more? Go to http://mona-rajni.info/. Enter S810 in the search box to learn more about \"Back Care and Preventing Injuries: Care Instructions. \" Current as of: March 21, 2017 Content Version: 11.4 © 5149-7468 [employer].  Care instructions adapted under license by [employer] (which disclaims liability or warranty for this information). If you have questions about a medical condition or this instruction, always ask your healthcare professional. [address] any warranty or liability for your use of this information. Introducing [facility] SERVICES! Dear Juan Fish: 
Thank you for requesting a [employer]ified account. Our records indicate that you already have an active Symplified account. You can access your account anytime at [email or web address] Did you know that you can access your hospital and ER discharge instructions at any time in Symplified? You can also review all of your test results from your hospital stay or ER visit. Additional Information If you have questions, please visit the Frequently Asked Questions section of the Symplified website at [email or web address] Remember, Symplified is NOT to be used for urgent needs. For medical emergencies, dial 911. Now available from your iPhone and Android! Please provide this summary of care documentation to your next provider. Your primary care clinician is listed as Migel Owen. If you have any questions after today's visit, please call [phone].

## 2018-07-03 ENCOUNTER — OFFICE VISIT (OUTPATIENT)
Dept: ORTHOPEDIC SURGERY | Age: 43
End: 2018-07-03

## 2018-07-03 VITALS
TEMPERATURE: 98.6 F | SYSTOLIC BLOOD PRESSURE: 116 MMHG | RESPIRATION RATE: 15 BRPM | BODY MASS INDEX: 35.99 KG/M2 | DIASTOLIC BLOOD PRESSURE: 60 MMHG | HEART RATE: 74 BPM | HEIGHT: 70 IN | OXYGEN SATURATION: 98 % | WEIGHT: 251.4 LBS

## 2018-07-03 DIAGNOSIS — R29.898 LEFT LEG WEAKNESS: ICD-10-CM

## 2018-07-03 DIAGNOSIS — M51.26 HNP (HERNIATED NUCLEUS PULPOSUS), LUMBAR: Primary | ICD-10-CM

## 2018-07-03 PROBLEM — E66.01 SEVERE OBESITY (BMI 35.0-39.9): Status: ACTIVE | Noted: 2018-07-03

## 2018-07-03 RX ORDER — IBUPROFEN 800 MG/1
TABLET ORAL DAILY
COMMUNITY

## 2018-07-03 RX ORDER — ACETAMINOPHEN 500 MG
500 TABLET ORAL 2 TIMES DAILY
COMMUNITY

## 2018-07-03 NOTE — PROGRESS NOTES
[address]  [address], [address]  [place], [address] [place] [address] Street  Phone: [phone]  Fax: [phone]  PROGRESS NOTE  Patient: Isaías Granados                MRN: [id number]       SSN: [id number]  YOB: 1975        AGE: 38 y.o. SEX: female  Body mass index is 36.07 kg/(m^2). PCP: Ollie Cotton MD  07/03/18    Chief Complaint   Patient presents with    Back Pain     F/U    Leg Pain       HISTORY OF PRESENT ILLNESS, RADIOGRAPHS, and PLAN:     HISTORY OF PRESENT ILLNESS:  Ms. Jamir Turner returns today. She is making slow improvements with both her pain and functionality. She is in a weight loss and exercise program.  The weight loss program is directed by her internist, exercise given her to do for her weakness and her foot drop. She feels she is slowly improving. The worst pain she gets she says is a 5. Generally, it is about a 3 in her leg. She in the pool doing workouts. She can elevate her foot off the ground, but her tibialis anterior is weak and fatigues. She has some atrophy of the muscle I can sense. ASSESSMENT/PLAN:  Given and went over the exercises for her. She will get a Thera-Band. We will see her back in 6 weeks' time. She is highly motivated. cc:  Dr. Fidencio Gallardo           Past Medical History:   Diagnosis Date    Anger     Anxiety     Chronic pain     back pain    GERD (gastroesophageal reflux disease)     Psychiatric disorder     depression    Seizures (HCC)     Encephalitis, seizures only as a child, none since       Family History   Problem Relation Age of Onset    Cancer Father     Drug Abuse Sister     No Known Problems Brother        Current Outpatient Prescriptions   Medication Sig Dispense Refill    ibuprofen (MOTRIN) 800 mg tablet Take  by mouth daily.  acetaminophen (TYLENOL EXTRA STRENGTH) 500 mg tablet Take 500 mg by mouth two (2) times a day.       cyclobenzaprine (FLEXERIL) 10 mg tablet Take 1 Tab by mouth three (3) times daily as needed for Muscle Spasm(s). 60 Tab 3    HYDROcodone-acetaminophen (NORCO) 5-325 mg per tablet 1 po tid as needed for severe pain 21 Tab 0    diclofenac EC (VOLTAREN) 75 mg EC tablet Take 1 Tab by mouth two (2) times daily (with meals). 180 Tab 1    buPROPion XL (WELLBUTRIN XL) 300 mg XL tablet Take 300 mg by mouth every morning.  escitalopram oxalate (LEXAPRO) 20 mg tablet Take 20 mg by mouth daily. 0    Cholecalciferol, Vitamin D3, 1,000 unit cap Take  by mouth.  Cyclobenzaprine (AMRIX) 15 mg cp24 SR capsule 1-2 po daily as needed for muscle spasm  Indications: Muscle Spasm 60 Cap 4       Allergies   Allergen Reactions    Betadine Surgi-Prep Rash    Neomycin Rash    Peroxyl [Hydrogen Peroxide] Rash    Sulfa Dyne Rash       Past Surgical History:   Procedure Laterality Date    HX BREAST REDUCTION Bilateral 2008    HX GYN      laparascopy    HX LUMBAR LAMINECTOMY  1998    L5    HX LUMBAR LAMINECTOMY  02/24/2016    L4    HX OTHER SURGICAL      abdominoplasty    HX TONSILLECTOMY  age 3       Past Medical History:   Diagnosis Date    Anger     Anxiety     Chronic pain     back pain    GERD (gastroesophageal reflux disease)     Psychiatric disorder     depression    Seizures (HCC)     Encephalitis, seizures only as a child, none since       Social History     Social History    Marital status:      Spouse name: N/A    Number of children: N/A    Years of education: N/A     Occupational History    Not on file.      Social History Main Topics    Smoking status: Current Every Day Smoker     Packs/day: 1.00     Years: 3.00     Last attempt to quit: 3/25/2005    Smokeless tobacco: Never Used    Alcohol use Yes      Comment: rarely    Drug use: No    Sexual activity: Not on file      Comment: Pregnancy test negative     Other Topics Concern    Not on file     Social History Narrative         REVIEW OF SYSTEMS:   CONSTITUTIONAL SYMPTOMS: Negative. EYES:  Negative. EARS, NOSE, THROAT AND MOUTH:  Negative. CARDIOVASCULAR:  Negative. RESPIRATORY:  Negative. GENITOURINARY: Per HPI. GASTROINTESTINAL:  Per HPI. INTEGUMENTARY (SKIN AND/OR BREAST):  Negative. MUSCULOSKELETAL: Per HPI.   ENDOCRINE/RHEUMATOLOGIC:  Negative. NEUROLOGICAL:  Per HPI. HEMATOLOGIC/LYMPHATIC:  Negative. ALLERGIC/IMMUNOLOGIC:  Negative. PSYCHIATRIC:  Negative. PHYSICAL EXAMINATION:   Visit Vitals    /60    Pulse 74    Temp 98.6 °F (37 °C)    Resp 15    Ht 5' 10\" (1.778 m)    Wt 251 lb 6.4 oz (114 kg)    SpO2 98%    BMI 36.07 kg/m2    PAIN SCALE: 3/10    CONSTITUTIONAL: The patient is in no apparent distress and is alert and oriented x 3. HEENT: Normocephalic. Hearing grossly intact. NECK: Supple and symmetric. no tenderness, or masses were felt. RESPIRATORY: No labored breathing. CARDIOVASCULAR: The carotid pulses were normal. Peripheral pulses were 2+. CHEST: Normal AP diameter and normal contour without any kyphoscoliosis. LYMPHATIC: No lymphadenopathy was appreciated in the neck, axillae or groin. SKIN: Negative for scars, rashes, lesions, or ulcers on the right upper, right lower, left upper, left lower and trunk. NEUROLOGICAL: Alert and oriented x 3. Ambulation without assistive device. FWB. EXTREMITIES: See musculoskeletal.  MUSCULOSKELETAL:   Head and Neck:  Negative for misalignment, asymmetry, crepitation, defects, tenderness masses or effusions.  Left Upper Extremity: Inspection, percussion and palpation preformed. Laurents sign is negative.  Right Upper Extremity: Inspection, percussion and palpation preformed. Laurents sign is negative.  Spine, Ribs and Pelvis: Inspection, percussion and palpation preformed. Negative for misalignment, asymmetry, crepitation, defects, tenderness masses or effusions.  Left Lower Extremity: Foot pain. Slight weakness. Inspection, percussion and palpation preformed. Negative straight leg raise.  Right Lower Extremity: Inspection, percussion and palpation preformed. Negative straight leg raise. SPINE EXAM:     Lumbar spine: No rash, ecchymosis, or gross obliquity. No focal atrophy is noted. ASSESSMENT    ICD-10-CM ICD-9-CM    1. HNP (herniated nucleus pulposus), lumbar M51.26 722.10    2. Left leg weakness R29.898 729.89        Written by Leandro Srivastava, as dictated by Mary Wade MD.    I, Dr. Mary Wade MD, confirm that all documentation is accurate.

## 2018-12-05 ENCOUNTER — OFFICE VISIT (OUTPATIENT)
Dept: ORTHOPEDIC SURGERY | Age: 43
End: 2018-12-05

## 2018-12-05 VITALS
HEART RATE: 80 BPM | SYSTOLIC BLOOD PRESSURE: 115 MMHG | RESPIRATION RATE: 18 BRPM | TEMPERATURE: 98 F | OXYGEN SATURATION: 99 % | DIASTOLIC BLOOD PRESSURE: 70 MMHG

## 2018-12-05 DIAGNOSIS — S39.012A BACK STRAIN, INITIAL ENCOUNTER: ICD-10-CM

## 2018-12-05 DIAGNOSIS — M62.838 MUSCLE SPASM: ICD-10-CM

## 2018-12-05 DIAGNOSIS — M51.26 HNP (HERNIATED NUCLEUS PULPOSUS), LUMBAR: Primary | ICD-10-CM

## 2018-12-05 RX ORDER — CYCLOBENZAPRINE HCL 10 MG
10 TABLET ORAL
Qty: 60 TAB | Refills: 3 | Status: SHIPPED | OUTPATIENT
Start: 2018-12-05

## 2018-12-05 RX ORDER — PREDNISONE 20 MG/1
TABLET ORAL
Qty: 20 TAB | Refills: 0 | Status: SHIPPED | OUTPATIENT
Start: 2018-12-05

## 2018-12-05 RX ORDER — HYDROCODONE BITARTRATE AND ACETAMINOPHEN 7.5; 325 MG/1; MG/1
1 TABLET ORAL
Qty: 20 TAB | Refills: 0 | Status: SHIPPED | OUTPATIENT
Start: 2018-12-05

## 2018-12-05 RX ORDER — TOPIRAMATE 25 MG/1
TABLET ORAL
Qty: 90 TAB | Refills: 1 | Status: SHIPPED | OUTPATIENT
Start: 2018-12-05 | End: 2019-03-20 | Stop reason: SDUPTHER

## 2018-12-05 NOTE — PROGRESS NOTES
[address]  [address], [address]  [place], [address] [place] [address] Street  Phone: [phone]  Fax: [phone]  PROGRESS NOTE  Patient: Jason Patel                MRN: [id number]       SSN: [id number]  YOB: 1975        AGE: 37 y.o. SEX: female  There is no height or weight on file to calculate BMI. PCP: Pat Veliz MD  12/05/18    Chief Complaint   Patient presents with    Back Pain     F/U       HISTORY OF PRESENT ILLNESS:  Jason Patel is a 37 y.o.  female with history of chronic back and LLE pain with foot drop for 10 months and radiation of pain to LLE in L5-S1 distribution w/ foot drop that has improved w/ conservative treatment. Prior history of back problems: recurrent self limited episodes of low back pain in the past and previous spinal surgery - Right L4-5 Lami in 2016. She has this interforaminal far lateral disc herniation. This is a recurrent disc herniation. This would be her third. She was evaluated by both Dr. Jeff Calix and Dr. Lily London earlier this year for possible surgery, which conservative treatment and weight loss was recommended. She last saw Dr. Jeff Calix 7/03/18 and was supposed to f/o in six weeks. She never did due to financial issues. She comes in today after a month ago she bent down to pick something up and heard a \"pop\" in her back and now she has this right-sided myofascial back pain. She continues to have left foot numbness and reports of functional foot drop, weakness on exam is minimal if any. She denies any RLE pain or weakness. Pain is worse with manual/sedentary work, walking and affects work and recreational activities. Pain is better with massage, relaxation and she went to her PCP's office yesterday and got a Toradol inj and what sounds like a prednisone inj. Denies bladder/bowel dysfunction, saddle paresthesia, weakness, gait disturbance, or other neurological deficits.  Denies chills, fever,night sweats, unexplained weight loss/weight gain, chest pain, sob or anxiety. Reports no new medical issues or hospitalizations since the last visit. Pt at this time desires to  continue with current care/proceed with medication evaluation/proceed with evaluation of back pain. Medications: OTC Motrin and PRN Flexerilwith minimal, relief. She reports she can't have Voltaren due to rectal bleeding. She has tried Topamax in the past with benefit    PMHx: GERD, Obesity      ASSESSMENT   Diagnoses and all orders for this visit:    1. HNP (herniated nucleus pulposus), lumbar  -     HYDROcodone-acetaminophen (NORCO) 7.5-325 mg per tablet; Take 1 Tab by mouth every eight (8) hours as needed for Pain. Max Daily Amount: 3 Tabs. 2. Muscle spasm  -     cyclobenzaprine (FLEXERIL) 10 mg tablet; Take 1 Tab by mouth three (3) times daily as needed for Muscle Spasm(s). 3. Back strain, initial encounter    Other orders  -     predniSONE (DELTASONE) 20 mg tablet; Take 3 tabs po x 3 days, then 2 tabs po x 3 days, then 1 tabs po x 3 days, then 1/2 tab po x 4 day  -     topiramate (TOPAMAX) 25 mg tablet; 1 tab nightly  x 5 days, then 2 tabs nightly x 5 day and then 3 tabs nightly         IMPRESSION AND PLAN:  This is a pt with now acute right sided back pain x1 month after an acute strain. She has a known left sided HNP, which her symptoms have improved w/ conservative tx. 1) Pt was given information on Back strain   2) Prednisone, taper, advised to stop all NSAIDs  3) PRN One time Rx for Norco  4) PRN Flexeril  5) Trial of Topamax  6) Offered AFO, pt declined  7) Continue HEP  4) Ms. Steffany Chawla has a reminder for a \"due or due soon\" health maintenance. I have asked that she contact her primary care provider, Thuan Govea MD, for follow-up on this health maintenance.   5) We have informed patient to notify us for immediate appointment if he has any worsening neurogical symptoms or if an emergency situation presents, then call 911  6)  has been reviewed and is appropriate  7) Pt will follow-up in 4 wks w/ Jo-Ann Leo. Subjective    Pain Scale: 7/10    Pain Assessment  12/5/2018   Location of Pain Back   Pain Location Comment -   Location Modifiers Inferior   Severity of Pain 7   Quality of Pain Aching; Deborra Fleischer; Other (Comment)   Quality of Pain Comment weakness left leg   Duration of Pain Persistent   Frequency of Pain Constant   Date Pain First Started -   Aggravating Factors -   Aggravating Factors Comment -   Limiting Behavior -   Relieving Factors -   Relieving Factors Comment -   Result of Injury -         REVIEW OF SYSTEMS  Constitutional: Negative for fever, chills, or weight change. Respiratory: Negative for cough or shortness of breath. Cardiovascular: Negative for chest pain or palpitations. Gastrointestinal: Negative for incontinence, acid reflux, change in bowel habits, or constipation. Genitourinary: Negative for incontinence, dysuria and flank pain. Musculoskeletal: Positive for back pain. See HPI. Skin: Negative for rash. Neurological:Left foot numbness, LLE L5-S1  radiculopathy. See HPI. Endo/Heme/Allergies: Negative. Psychiatric/Behavioral: Negative. PHYSICAL EXAMINATION  Visit Vitals  /70   Pulse 80   Temp 98 °F (36.7 °C)   Resp 18   SpO2 99%         Accompanied by Significant other. Constitutional:  Well developed, well nourished, in no acute distress. Psychiatric: Affect and mood are appropriate. Integumentary: No rashes or abrasions noted on exposed areas. Cardiovascular/Peripheral Vascular: +2 radial & pedal pulses. No peripheral edema is noted. Lymphatic:  No evidence of lymphedema. No cervical lymphadenopathy. SPINE/MUSCULOSKELETAL EXAM     Lumbar spine:  No rash, ecchymosis, or gross obliquity. No fasciculations. No focal atrophy is noted. Range of motion is pain with flexion, extension, turning right. Tenderness to palpation right low back L4-5.  No tenderness to palpation at the sciatic notch. SI joints non-tender. Trochanters non tender. Straight leg raise Negative    Sensation grossly intact to light touch. MOTOR:     Hip Flex Quads Hamstrings Ankle DF EHL Ankle PF   Right +4/5 +4/5 +4/5 +4/5 +4/5 +4/5   Left +4/5 +4/5 +4/5 +4/5 +4/5 +4/5       Ambulation without assistive device. FWB. Slow non-antalgic gait        PAST MEDICAL HISTORY   Past Medical History:   Diagnosis Date    Anger     Anxiety     Chronic pain     back pain    GERD (gastroesophageal reflux disease)     Psychiatric disorder     depression    Seizures (HCC)     Encephalitis, seizures only as a child, none since       Past Surgical History:   Procedure Laterality Date    HX BREAST REDUCTION Bilateral 2008    HX GYN      laparascopy    HX LUMBAR LAMINECTOMY  1998    L5    HX LUMBAR LAMINECTOMY  02/24/2016    L4    HX OTHER SURGICAL      abdominoplasty    HX TONSILLECTOMY  age 3   . MEDICATIONS      Current Outpatient Medications   Medication Sig Dispense Refill    cyclobenzaprine (FLEXERIL) 10 mg tablet Take 1 Tab by mouth three (3) times daily as needed for Muscle Spasm(s). 60 Tab 3    predniSONE (DELTASONE) 20 mg tablet Take 3 tabs po x 3 days, then 2 tabs po x 3 days, then 1 tabs po x 3 days, then 1/2 tab po x 4 day 20 Tab 0    HYDROcodone-acetaminophen (NORCO) 7.5-325 mg per tablet Take 1 Tab by mouth every eight (8) hours as needed for Pain. Max Daily Amount: 3 Tabs. 20 Tab 0    topiramate (TOPAMAX) 25 mg tablet 1 tab nightly  x 5 days, then 2 tabs nightly x 5 day and then 3 tabs nightly 90 Tab 1    ibuprofen (MOTRIN) 800 mg tablet Take  by mouth daily.  acetaminophen (TYLENOL EXTRA STRENGTH) 500 mg tablet Take 500 mg by mouth two (2) times a day.  buPROPion XL (WELLBUTRIN XL) 300 mg XL tablet Take 300 mg by mouth every morning.  escitalopram oxalate (LEXAPRO) 20 mg tablet Take 20 mg by mouth daily. 0    Cholecalciferol, Vitamin D3, 1,000 unit cap Take  by mouth.  diclofenac EC (VOLTAREN) 75 mg EC tablet Take 1 Tab by mouth two (2) times daily (with meals).  180 Tab 1        ALLERGIES    Allergies   Allergen Reactions    Betadine Surgi-Prep Rash    Neomycin Rash    Peroxyl [Hydrogen Peroxide] Rash    Sulfa Dyne Rash          SOCIAL HISTORY    Social History     Socioeconomic History    Marital status:      Spouse name: Not on file    Number of children: Not on file    Years of education: Not on file    Highest education level: Not on file   Social Needs    Financial resource strain: Not on file    Food insecurity - worry: Not on file    Food insecurity - inability: Not on file   Nutrinia needs - medical: Not on file   Nutrinia needs - non-medical: Not on file   Occupational History    Not on file   Tobacco Use    Smoking status: Current Every Day Smoker     Packs/day: 1.00     Years: 3.00     Pack years: 3.00     Last attempt to quit: 3/25/2005     Years since quittin.7    Smokeless tobacco: Never Used   Substance and Sexual Activity    Alcohol use: Yes     Comment: rarely    Drug use: No    Sexual activity: Not on file     Comment: Pregnancy test negative   Other Topics Concern    Not on file   Social History Narrative    Not on file       FAMILY HISTORY    Family History   Problem Relation Age of Onset    Cancer Father     Drug Abuse Sister     No Known Problems Brother          Jade Bradford, GALE

## 2018-12-05 NOTE — PATIENT INSTRUCTIONS
Back Strain: Care Instructions  Overview    A back strain happens when you overstretch, or pull, a muscle in your back. You may hurt your back in an accident or when you exercise or lift something. Sometimes you may not know how you hurt your back. Most back pain will get better with rest and time. You can take care of yourself at home to help your back heal.  Follow-up care is a key part of your treatment and safety. Be sure to make and go to all appointments, and call your doctor if you are having problems. It's also a good idea to know your test results and keep a list of the medicines you take. How can you care for yourself at home? · Try to stay as active as you can, but stop or reduce any activity that causes pain. · Put ice or a cold pack on the sore muscle for 10 to 20 minutes at a time to stop swelling. Try this every 1 to 2 hours for 3 days (when you are awake) or until the swelling goes down. Put a thin cloth between the ice pack and your skin. · After 2 or 3 days, apply a heating pad on low or a warm cloth to your back. Some doctors suggest that you go back and forth between hot and cold treatments. · Take pain medicines exactly as directed. ? If the doctor gave you a prescription medicine for pain, take it as prescribed. ? If you are not taking a prescription pain medicine, ask your doctor if you can take an over-the-counter medicine. · Try sleeping on your side with a pillow between your legs. Or put a pillow under your knees when you lie on your back. These measures can ease pain in your lower back. · Return to your usual level of activity slowly. When should you call for help? Call 911 anytime you think you may need emergency care. For example, call if:    · You are unable to move a leg at all.   [facility] your doctor now or seek immediate medical care if:    · You have new or worse symptoms in your legs, belly, or buttocks.  Symptoms may include:  ? Numbness or tingling. ? Weakness. ? Pain.     · You lose bladder or bowel control.    Watch closely for changes in your health, and be sure to contact your doctor if:    · You have a fever, lose weight, or don't feel well.     · You are not getting better as expected. Where can you learn more? Go to http://mona-rajni.info/. Enter J605 in the search box to learn more about \"Back Strain: Care Instructions. \"  Current as of: November 29, 2017  Content Version: 11.8  © 8944-4784 [employer]. Care instructions adapted under license by [employer] (which disclaims liability or warranty for this information). If you have questions about a medical condition or this instruction, always ask your healthcare professional. [address] any warranty or liability for your use of this information.

## 2020-11-16 ENCOUNTER — TRANSCRIBE ORDER (OUTPATIENT)
Dept: SCHEDULING | Age: 45
End: 2020-11-16

## 2020-11-16 DIAGNOSIS — Z80.8 FAMILY HISTORY OF OTHER SPECIFIED MALIGNANT NEOPLASM: Primary | ICD-10-CM

## 2020-11-16 DIAGNOSIS — E04.9 ENLARGEMENT OF THYROID: ICD-10-CM

## 2020-11-20 ENCOUNTER — HOSPITAL ENCOUNTER (OUTPATIENT)
Dept: ULTRASOUND IMAGING | Age: 45
Discharge: HOME | End: 2020-11-20
Attending: INTERNAL MEDICINE
Payer: COMMERCIAL

## 2020-11-20 DIAGNOSIS — Z80.8 FAMILY HISTORY OF OTHER SPECIFIED MALIGNANT NEOPLASM: ICD-10-CM

## 2020-11-20 DIAGNOSIS — E04.9 ENLARGEMENT OF THYROID: ICD-10-CM

## 2020-11-20 PROCEDURE — 76536 US EXAM OF HEAD AND NECK: CPT

## 2022-01-18 ENCOUNTER — OFFICE VISIT (OUTPATIENT)
Dept: ORTHOPEDIC SURGERY | Age: 47
End: 2022-01-18
Payer: COMMERCIAL

## 2022-01-18 VITALS
WEIGHT: 273 LBS | OXYGEN SATURATION: 97 % | TEMPERATURE: 97.6 F | HEART RATE: 82 BPM | HEIGHT: 70 IN | BODY MASS INDEX: 39.08 KG/M2 | RESPIRATION RATE: 18 BRPM

## 2022-01-18 DIAGNOSIS — G89.29 CHRONIC LEFT-SIDED LOW BACK PAIN WITH LEFT-SIDED SCIATICA: ICD-10-CM

## 2022-01-18 DIAGNOSIS — M54.42 CHRONIC LEFT-SIDED LOW BACK PAIN WITH LEFT-SIDED SCIATICA: ICD-10-CM

## 2022-01-18 DIAGNOSIS — R29.898 LEFT LEG WEAKNESS: ICD-10-CM

## 2022-01-18 DIAGNOSIS — M79.2 NEURITIS: ICD-10-CM

## 2022-01-18 DIAGNOSIS — M79.18 MYOFASCIAL MUSCLE PAIN: ICD-10-CM

## 2022-01-18 DIAGNOSIS — M47.816 LUMBAR SPONDYLOSIS: ICD-10-CM

## 2022-01-18 DIAGNOSIS — E66.01 SEVERE OBESITY (BMI 35.0-39.9) WITH COMORBIDITY (HCC): ICD-10-CM

## 2022-01-18 DIAGNOSIS — M51.26 HNP (HERNIATED NUCLEUS PULPOSUS), LUMBAR: Primary | ICD-10-CM

## 2022-01-18 PROCEDURE — 99214 OFFICE O/P EST MOD 30 MIN: CPT | Performed by: PHYSICAL MEDICINE & REHABILITATION

## 2022-01-18 RX ORDER — PREGABALIN 50 MG/1
CAPSULE ORAL
Qty: 90 CAPSULE | Refills: 1 | Status: SHIPPED | OUTPATIENT
Start: 2022-01-18

## 2022-01-18 RX ORDER — CHOLECALCIFEROL (VITAMIN D3) 125 MCG
CAPSULE ORAL
COMMUNITY

## 2022-01-18 NOTE — PATIENT INSTRUCTIONS
Herniated Disc: Exercises  Introduction  Here are some examples of exercises for you to try. The exercises may be suggested for a condition or for rehabilitation. Start each exercise slowly. Ease off the exercises if you start to have pain. You will be told when to start these exercises and which ones will work best for you. How to stay safe  These exercises can help you move easier and feel better. But when you first start doing them, you may have more pain in your back. This is normal. But it is important to pay close attention to your pain during and after each exercise. · Keep doing these exercises if your pain stays the same or moves from your leg and buttock more toward the middle of your spine. Pain moving out of your leg and buttock is a good sign. · Stop doing these exercises if your pain gets worse in your leg and buttock. Stop if you start to have pain in your leg and buttock that you didn't have before. Be sure to do these exercises in the order they appear. Note how your pain changes before you move to the next one. If your pain is much worse right after exercise and stays worse the next day, do not do any of these exercises. How to do the exercises  1. Rest on belly    1. Lie on your stomach, with your head turned to the side. 2. Try to relax your lower back muscles as much as you can. 3. Continue to lie on your stomach for 2 minutes. · Keep your arms beside your body. · If that position bothers your neck, place your hands, one on top of the other, underneath your forehead. This will help support your head and neck. If lying in this position causes or increases pain down your leg, stop this exercise and do not do the next exercises. 2. Press-up back extension    1. Lie on your stomach, with your face down and your elbows tucked into your sides and under your shoulders. 2. Press your elbows down into the floor to raise your upper back.   3. Hold this position for 15 to 30 seconds. 4. Repeat 2 to 4 times. · As you do this, relax your stomach muscles and allow your back to arch without using your back muscles. · Let your low back relax completely as you arch up. Don't let your hips or pelvis come off the floor. Then relax, and return to the start position. Over time, work up to staying in the press-up position for up to 2 minutes. If lying in this position causes or increases pain down your leg, stop this exercise and do not do the next exercises. 3. Full press-up back extension    1. Lie on your stomach with your face down, keeping your elbows tucked into your sides and under your shoulders. 2. Straighten your elbows, and push your upper body up as far as you can. 3. Hold this position for 5 seconds, and then relax. 4. Repeat 10 times. Allow your lower back to sag. Keep your hips, pelvis, and legs relaxed. Each time, try to raise your upper body a little higher and hold your arms a bit straighter. If lying in this position causes or increases pain down your leg, stop this exercise and do not do the next exercises. If you can't do this exercise, you may instead try the backward bend exercise that follows. 4. Backward bend    1. Stand with your feet hip-width apart, and don't lock your knees. 2. Place your hands in the small of your back. 3. Bend backward as far as you can, keeping your knees straight. 4. Repeat 2 to 4 times. Your toes should be pointing forward. Hold this position for 2 to 3 seconds. Then return to your starting position. Each time, try to bend backward a little farther, until you bend backward as far as you can. If standing in this position causes or increases pain down your leg, stop this exercise. Follow-up care is a key part of your treatment and safety. Be sure to make and go to all appointments, and call your doctor if you are having problems. It's also a good idea to know your test results and keep a list of the medicines you take.   Where can you learn more? Go to http://www.gray.com/  Enter Z594 in the search box to learn more about \"Herniated Disc: Exercises. \"  Current as of: July 1, 2021               Content Version: 13.0  © 2006-2021 [employer]. Care instructions adapted under license by [employer] (which disclaims liability or warranty for this information). If you have questions about a medical condition or this instruction, always ask your healthcare professional. [address] any warranty or liability for your use of this information. Low Back Arthritis: Exercises  Introduction  Here are some examples of typical rehabilitation exercises for your condition. Start each exercise slowly. Ease off the exercise if you start to have pain. Your doctor or physical therapist will tell you when you can start these exercises and which ones will work best for you. When you are not being active, find a comfortable position for rest. Some people are comfortable on the floor or a medium-firm bed with a small pillow under their head and another under their knees. Some people prefer to lie on their side with a pillow between their knees. Don't stay in one position for too long. Take short walks (10 to 20 minutes) every 2 to 3 hours. Avoid slopes, hills, and stairs until you feel better. Walk only distances you can manage without pain, especially leg pain. How to do the exercises  Pelvic tilt    1. Lie on your back with your knees bent. 2. \"Brace\" your stomach--tighten your muscles by pulling in and imagining your belly button moving toward your spine. 3. Press your lower back into the floor. You should feel your hips and pelvis rock back. 4. Hold for 6 seconds while breathing smoothly. 5. Relax and allow your pelvis and hips to rock forward. 6. Repeat 8 to 12 times. Back stretches    1. Get down on your hands and knees on the floor.   2. Relax your head and allow it to droop. Round your back up toward the ceiling until you feel a nice stretch in your upper, middle, and lower back. Hold this stretch for as long as it feels comfortable, or about 15 to 30 seconds. 3. Return to the starting position with a flat back while you are on your hands and knees. 4. Let your back sway by pressing your stomach toward the floor. Lift your buttocks toward the ceiling. 5. Hold this position for 15 to 30 seconds. 6. Repeat 2 to 4 times. Follow-up care is a key part of your treatment and safety. Be sure to make and go to all appointments, and call your doctor if you are having problems. It's also a good idea to know your test results and keep a list of the medicines you take. Where can you learn more? Go to http://www.bonilla.com/  Enter T094 in the search box to learn more about \"Low Back Arthritis: Exercises. \"  Current as of: July 1, 2021               Content Version: 13.0  © 2006-2021 Healthwise, [employer]. Care instructions adapted under license by [employer] (which disclaims liability or warranty for this information). If you have questions about a medical condition or this instruction, always ask your healthcare professional. [address] any warranty or liability for your use of this information.

## 2022-01-18 NOTE — PROGRESS NOTES
[facility]  [address] [place], [facility] [address]  Phone: [phone]  Fax: [phone]    Pt's YOB: 1975    ASSESSMENT   Diagnoses and all orders for this visit:    1. HNP (herniated nucleus pulposus), lumbar    2. Lumbar spondylosis    3. Neuritis  -     pregabalin (Lyrica) 50 mg capsule; Take 1 cap by mouth in the morning and 2 at night, tapering up as directed. 4. Myofascial muscle pain    5. Chronic left-sided low back pain with left-sided sciatica    6. Left leg weakness    7. Severe obesity (BMI 35.0-39. 9) with comorbidity [facility])         IMPRESSION AND PLAN:  Beth Johnson is a 48 y.o. female with history of lumbar pain. She complains of continued left leg weakness, progressive with usage. Pt takes Flexeril 10 mg occasionally with morning sedation. 1) Pt was given information on herniated disc and lumbar arthritis exercises. 2) Discussed treatment options with the patient including home exercises and medications. 3) Pt was prescribed Lyrica 50 mg 1 cap QAM and 2 caps QHS, tapering up as directed. 4) I encouraged the patient to exercise regularly, 20-30 minutes a day, 5 days a week. 5) I recommended the patient try pilates, chair yoga, and beginners' yoga and suggested she try a Reformer device. 6) Pt was advised to discuss vitamin D3 and zinc supplementation with her PCP. 7) Ms. Apollo Zepeda has a reminder for a \"due or due soon\" health maintenance. I have asked that she contact her primary care provider, Radha Silverman MD, for follow-up on this health maintenance. 8)  demonstrated consistency with prescribing. 9) Weight loss recommended  10) Recommend consideration for new MRI to assess for worsening stenosis pending response to medication  Follow-up and Dispositions    · Return in about 6 weeks (around 3/1/2022) for Medication follow up. HISTORY OF PRESENT ILLNESS:  Beth Johnson is a 48 y.o. female with history of lumbar pain and presents to the office today. She was last seen in the office on 04/11/2018 by me and was referred by Dr. May Riedel. Pt complains of continued weakness in the left leg, progressive with usage, difficulty walking long distances, and a grocery cart sign. She denies any right-sided symptoms. Pt states that she managers her symptoms with medications and antalgic maneuvers. Pt admits that she notes that, on a trip to [facility], she had an episode of pain that prevented her from walking and involved incontinence. She further notes that she has reduced her activity and her left leg has atrophied significantly due to paralysis. Pt confirms she has previously enrolled in physical therapy. She also states has a HEP of abdominal isometric exercises, rides a bicycle as weather permits, and has previously done water aerobics years ago. Pt takes Flexeril 10 mg occasionally with morning sedation, states that 20 mg makes her very sleepy, and notes that she once took 30 mg after a surgery. She also supplements with vitamin D. Pt states that she previously took Percocet  mg in 1/2-tab doses prior to a surgery and noticed mild withdrawals after weaning off the medication. She has discontinued Topamax due to side effects of taste impairment and discontinued Neurontin due to sedation. Pt also reports that she has undergone steroid injections and radiofrequency ablation and notes that she inquired to Dr. Ilda Verde about stem cell injections and was told she did not have enough disc material left to repair in that way. She states that she followed up with Dr. Ilda Verde in 07/2018 and was told that surgical intervention might cause further paralysis. Pt at this time desires to proceed with medication evaluation. Of note, pt suspects that she and her family contracted COVID-19 2 weeks ago. She also notes that she has received the COVID-19 booster vaccine.     Pain Scale: 1/10    PCP: Felicita Byrnes MD     Past Medical History:   Diagnosis Date    Anger     Anxiety     Chronic pain     back pain    GERD (gastroesophageal reflux disease)     Psychiatric disorder     depression    Seizures (HCC)     Encephalitis, seizures only as a child, none since        Social History     Socioeconomic History    Marital status:      Spouse name: Not on file    Number of children: Not on file    Years of education: Not on file    Highest education level: Not on file   Occupational History    Not on file   Tobacco Use    Smoking status: Current Every Day Smoker     Packs/day: 1.00     Years: 3.00     Pack years: 3.00     Last attempt to quit: 3/25/2005     Years since quittin.8    Smokeless tobacco: Never Used   Substance and Sexual Activity    Alcohol use: Yes     Comment: rarely    Drug use: No    Sexual activity: Not on file     Comment: Pregnancy test negative   Other Topics Concern    Not on file   Social History Narrative    Not on file     Social Determinants of Health     Financial Resource Strain:     Difficulty of Paying Living Expenses: Not on file   Food Insecurity:     Worried About Running Out of Food in the Last Year: Not on file    Caterina of Food in the Last Year: Not on file   Transportation Needs:     Lack of Transportation (Medical): Not on file    Lack of Transportation (Non-Medical):  Not on file   Physical Activity:     Days of Exercise per Week: Not on file    Minutes of Exercise per Session: Not on file   Stress:     Feeling of Stress : Not on file   Social Connections:     Frequency of Communication with Friends and Family: Not on file    Frequency of Social Gatherings with Friends and Family: Not on file    Attends [employer] Services: Not on file    Active Member of Clubs or Organizations: Not on file    Attends Club or Organization Meetings: Not on file    Marital Status: Not on file   Intimate Partner Violence:     Fear of Current or Ex-Partner: Not on file   Eileen Dooley Emotionally Abused: Not on file    Physically Abused: Not on file    Sexually Abused: Not on file   Housing Stability:     Unable to Pay for Housing in the Last Year: Not on file    Number of Places Lived in the Last Year: Not on file    Unstable Housing in the Last Year: Not on file       Current Outpatient Medications   Medication Sig Dispense Refill    naproxen sodium (Aleve) 220 mg cap Take  by mouth.  pregabalin (Lyrica) 50 mg capsule Take 1 cap by mouth in the morning and 2 at night, tapering up as directed. 90 Capsule 1    cyclobenzaprine (FLEXERIL) 10 mg tablet Take 1 Tab by mouth three (3) times daily as needed for Muscle Spasm(s). 60 Tab 3    ibuprofen (MOTRIN) 800 mg tablet Take  by mouth daily.  buPROPion XL (WELLBUTRIN XL) 300 mg XL tablet Take 300 mg by mouth every morning.  escitalopram oxalate (LEXAPRO) 20 mg tablet Take 20 mg by mouth daily. 0    Cholecalciferol, Vitamin D3, 1,000 unit cap Take  by mouth.  topiramate (TOPAMAX) 25 mg tablet take 1 tablet by mouth every evening for 5 days then 2 every evening for 5 days then 3 every evening (Patient not taking: Reported on 1/18/2022) 90 Tab 0    predniSONE (DELTASONE) 20 mg tablet Take 3 tabs po x 3 days, then 2 tabs po x 3 days, then 1 tabs po x 3 days, then 1/2 tab po x 4 day (Patient not taking: Reported on 1/18/2022) 20 Tab 0    HYDROcodone-acetaminophen (NORCO) 7.5-325 mg per tablet Take 1 Tab by mouth every eight (8) hours as needed for Pain. Max Daily Amount: 3 Tabs. (Patient not taking: Reported on 1/18/2022) 20 Tab 0    acetaminophen (TYLENOL EXTRA STRENGTH) 500 mg tablet Take 500 mg by mouth two (2) times a day. (Patient not taking: Reported on 1/18/2022)      diclofenac EC (VOLTAREN) 75 mg EC tablet Take 1 Tab by mouth two (2) times daily (with meals).  (Patient not taking: Reported on 1/18/2022) 180 Tab 1       Allergies   Allergen Reactions    Betadine Surgi-Prep Rash    Neomycin Rash    Peroxyl [Hydrogen Peroxide] Rash    Sulfa Dyne Rash         REVIEW OF SYSTEMS    Constitutional: Negative for fever, chills, or weight change. Respiratory: Negative for cough or shortness of breath. Cardiovascular: Negative for chest pain or palpitations. Gastrointestinal: Negative for acid reflux, change in bowel habits, or constipation. Genitourinary: Negative for dysuria and flank pain. Musculoskeletal: Positive for left leg weakness. Skin: Negative for rash. Neurological: Negative for headaches, dizziness, positive for numbness. Endo/Heme/Allergies: Negative for increased bruising. Psychiatric/Behavioral: Negative for difficulty with sleep. As per HPI    PHYSICAL EXAMINATION  Visit Vitals  Pulse 82   Temp 97.6 °F (36.4 °C) (Temporal)   Resp 18   Ht 5' 10\" (1.778 m)   Wt 273 lb (123.8 kg)   SpO2 97%   BMI 39.17 kg/m²       Constitutional: Awake, alert, and in no acute distress. Neurological:  Decreased sensation to light touch on the left lower extremity. Skin: warm, dry, and intact. Musculoskeletal: Moderate pain with extension, axial loading, and forward flexion. No pain with internal or external rotation of her hips. Positive straight leg raise on the left. Hip Flex  Quads Hamstrings Ankle DF EHL Ankle PF   Right 5/5 5/5 5/5 5/5 5/5 5/5   Left 4/5 4/5 4/5 4/5 4/5 4/5     IMAGING:    Lumbar spine MRI from 04/05/2018 was personally reviewed with the patient and demonstrated:     Results from [place] encounter on 04/05/18   MRI LUMB SPINE W WO CONT    Narrative EXAM: Lumbar MRI with and without contrast    CLINICAL INDICATION: Left-sided low back pain    TECHNIQUE: MRI of the lumbar spine with and without contrast obtained. Multiplanar multisequence MR images of the lumbar spine obtained. IV Contrast: 12 cc of Gadavist    COMPARISON: 11/14/2015    FINDINGS: All numbering assumes 5 lumbar type vertebrae. The alignment is  unremarkable.  The marrow signal is normal. The cord terminates at L1. No cord  signal abnormalities appreciated. Postoperative changes are noted at L4-L5 and  L5-S1. The abdominal aorta is without evidence of aneurysm. No paraaortic adenopathy  appreciated. The visualized kidneys are unremarkable. L1-L2 level: Minimal degenerative changes are noted. No significant canal or  neural foraminal stenosis appreciated. L2-L3: Facet arthropathy and mild disc bulge is noted. No significant canal or  neural foraminal stenosis. L3-L4: Facet arthropathy and broad-based disc bulge are noted. No severe canal  stenosis or neural foraminal stenosis appreciated. L4-L5: Right paracentral hemilaminectomy is noted. The previously seen disc  protrusion is no longer appreciated. No focus of abnormal enhancement  identified. A new left foraminal protrusion is noted which causes moderate to  severe left neural foraminal narrowing. The right neural foramen is mildly  narrowed. No significant canal stenosis. L5-S1: Postoperative changes are noted with left hemicolectomy. Facet  arthropathy and central disc protrusion is noted. No severe canal stenosis  appreciated. Mild left and minimal right neural foraminal narrowing are noted.             Impression Impression:  1. Postoperative changes at L4-L5 and L5-S1.    2.  New left foraminal disc protrusion is causing moderate to severe left neural  foraminal narrowing at L4-L5.    3. Interval treatment of the previously seen right paracentral disc protrusion  of L4-L5.                 Written by Maxwell Rob, as dictated by Felicita Fine MD.  I, Dr. Felicita Fine confirm that all documentation is accurate.

## 2022-03-08 ENCOUNTER — TELEPHONE (OUTPATIENT)
Dept: ORTHOPEDIC SURGERY | Age: 47
End: 2022-03-08

## 2022-03-18 DIAGNOSIS — M79.2 NEURITIS: ICD-10-CM

## 2022-03-18 RX ORDER — PREGABALIN 50 MG/1
CAPSULE ORAL
Qty: 90 CAPSULE | OUTPATIENT
Start: 2022-03-18

## 2022-03-18 NOTE — TELEPHONE ENCOUNTER
Please see if pt requested this -- she recently missed her follow up for medication -- can do 1-2 months if needed

## 2022-03-19 PROBLEM — M54.40 CHRONIC LEFT-SIDED LOW BACK PAIN WITH SCIATICA: Status: ACTIVE | Noted: 2018-03-27

## 2022-03-19 PROBLEM — G89.29 CHRONIC LEFT-SIDED LOW BACK PAIN WITH SCIATICA: Status: ACTIVE | Noted: 2018-03-27

## 2022-03-19 PROBLEM — R29.898 LEFT LEG WEAKNESS: Status: ACTIVE | Noted: 2018-03-27

## 2022-05-23 NOTE — PATIENT INSTRUCTIONS
Herniated Disc: Care Instructions  Your Care Instructions    The bones that form the spine in your back are cushioned by small discs. If a disc is damaged, it may bulge or break open (herniate). A herniated disc can result from normal wear and tear as we age or from an injury or disease. If a herniated disc presses on a nerve, it can cause pain and numbness in your leg (sciatica) and/or back pain. You may be able to heal your herniated disc with a few weeks or months of rest, medicine, and exercises. In some cases, you may need surgery. Follow-up care is a key part of your treatment and safety. Be sure to make and go to all appointments, and call your doctor if you are having problems. It's also a good idea to know your test results and keep a list of the medicines you take. How can you care for yourself at home? · Take your medicines exactly as prescribed. Call your doctor if you think you are having a problem with your medicine. · Ask your doctor if you can take an over-the-counter pain medicine, such as acetaminophen (Tylenol), ibuprofen (Advil, Motrin), or naproxen (Aleve). Read and follow all instructions on the label. · Do not take two or more pain medicines at the same time unless the doctor told you to. Many pain medicines have acetaminophen, which is Tylenol. Too much acetaminophen (Tylenol) can be harmful. · Rest your back if your pain is severe. · Avoid movements and positions that increase your pain or numbness. · Try taking short walks and doing light activities that do not cause pain. Even if you are feeling some pain, it is important to keep your muscles active and strong. · Use heat or ice to relieve pain. ¨ To apply heat, put a warm water bottle, heating pad set on low, or warm cloth on your back. Do not go to sleep with a heating pad on your skin. ¨ To use ice, put ice or a cold pack on the area for 10 to 20 minutes at a time. Put a thin cloth between the ice and your skin.   · Your doctor may recommend a physical therapy program, where you learn exercises to do at home. These exercises strengthen the muscles that support your lower back and prevent reinjury. · Stay at a healthy weight. This may reduce the load on your back. · Quit smoking if you smoke. If you need help quitting, talk to your doctor about stop-smoking programs and medicines. These can increase your chances of quitting for good. · To avoid hurting your back when lifting:  ¨ Lift with your legs, not your back, by squatting and bending your knees. Avoid bending forward at the waist when lifting. ¨ Rise slowly. ¨ Keep the load as close to your body as possible, at the level of your navel. ¨ Avoid turning or twisting your body while holding a heavy object. ¨ Get help if you need to lift a heavy object. Never lift a heavy object above shoulder level. When should you call for help? Call 911 anytime you think you may need emergency care. For example, call if:  ? · You are unable to move a leg at all. ?Call your doctor now or seek immediate medical care if:  ? · You have new or worse symptoms in your arms, legs, chest, belly, or buttocks. Symptoms may include:  ¨ Numbness or tingling. ¨ Weakness. ¨ Pain. ? · You lose bladder or bowel control. ? Watch closely for changes in your health, and be sure to contact your doctor if:  ? · You are not getting better as expected. Where can you learn more? Go to http://mona-rajni.info/. Enter F534 in the search box to learn more about \"Herniated Disc: Care Instructions. \"  Current as of: March 21, 2017  Content Version: 11.4  © 4724-2273 [employer]. Care instructions adapted under license by [employer] (which disclaims liability or warranty for this information).  If you have questions about a medical condition or this instruction, always ask your healthcare professional. [address] any warranty or liability for your use of this information. Herniated Disc: Exercises  Your Care Instructions  Here are some examples of typical rehabilitation exercises for your condition. Start each exercise slowly. Ease off the exercise if you start to have pain. Your doctor or physical therapist will tell you when you can start these exercises and which ones will work best for you. Back Exercises  These exercises can help you move easier and feel better. But when you first start doing them, you may have more pain in your back. This is normal. But it is important to pay close attention to your pain during and after each exercise. · Keep doing these exercises if your pain stays the same or moves from your leg and buttock more toward the middle of your spine. Pain moving out of your leg and buttock is a good sign. · Stop doing these exercises if your pain gets worse in your leg and buttock. Stop if you start to have pain in your leg and buttock that you didn't have before. Be sure to do these exercises in the order they appear. Note how your pain changes before you move to the next one. If your pain is much worse right after exercise and stays worse the next day, do not do any of these exercises. How to do the exercises  1. Rest on belly    1. Lie on your stomach, face down, with your head turned to the side. Place your arms beside your body. If this bothers your neck, place your hands, one on top of the other, underneath your forehead. This will help support your head and neck. 2. Try to relax your lower back muscles as much as you can. 3. Continue to lie on your stomach for 2 minutes. 4. If your pain spreads down your leg or increases down your leg, stop this exercise and do not do the next exercises. 2. Press-up    1. Lie on your stomach, face down. Keep your elbows tucked into your sides and under your shoulders. 2. Press your elbows down into the floor to raise your upper back. As you do this, relax your stomach muscles. Allow your back to arch without using your back muscles. Let your low back relax completely as you arch up. 3. Hold this position for 2 minutes. 4. Repeat 2 to 4 times. 5. If your pain spreads down your leg or increases down your leg, stop this exercise and do not do the next exercises. 3. Full press-up    1. Lie on your stomach, face down. Keep your elbows tucked into your sides and under your shoulders. 2. Straighten your elbows, and push your upper body up as far as you can. Allow your lower back to sag. Keep your hips, pelvis, and legs relaxed. 3. Hold this position for 5 seconds, and then relax. 4. Repeat 10 times. Each time, try to raise your upper body a little higher and hold your arms a bit straighter. 5. If your pain spreads down your leg or gets worse down your leg, stop this exercise and do not move to the next exercise. 6. If you can't do this exercise, you may instead try the backward bend exercise that follows. 4. Backward bend    1. Stand with your feet hip-width apart. Your toes should point forward. Do not lock your knees. 2. Place your hands in the small of your back. 3. Bend backward as far as you can, keeping your knees straight. Hold this position for 2 to 3 seconds. Then return to your starting position. 4. Repeat 2 to 4 times. Each time, try to bend backward a little farther, until you bend backward as far as you can. 5. If your pain spreads down your leg or increases down your leg, stop this exercise. Follow-up care is a key part of your treatment and safety. Be sure to make and go to all appointments, and call your doctor if you are having problems. It's also a good idea to know your test results and keep a list of the medicines you take. Where can you learn more? Go to http://mona-rajni.info/. Enter 61292 [id number] in the search box to learn more about \"Herniated Disc: Exercises. \"  Current as of: March 21, 2017  Content Version: 11.4  © 6278-5216 [employer]. Care instructions adapted under license by [employer] (which disclaims liability or warranty for this information). If you have questions about a medical condition or this instruction, always ask your healthcare professional. [address] any warranty or liability for your use of this information. [address] [employer]: Preventive Care And Screening: Influenza Immunization: Influenza Immunization Administered during Influenza season Detail Level: Generalized

## 2023-11-26 ENCOUNTER — HOSPITAL ENCOUNTER (OUTPATIENT)
Facility: HOSPITAL | Age: 48
Discharge: HOME | End: 2023-11-29
Attending: ORTHOPAEDIC SURGERY
Payer: COMMERCIAL

## 2023-11-26 DIAGNOSIS — R53.1 WEAKNESS: ICD-10-CM

## 2023-11-26 DIAGNOSIS — M54.10 RADICULOPATHY, UNSPECIFIED SPINAL REGION: ICD-10-CM

## 2023-11-26 PROCEDURE — 72141 MRI NECK SPINE W/O DYE: CPT

## 2023-11-26 PROCEDURE — 72148 MRI LUMBAR SPINE W/O DYE: CPT

## (undated) DEVICE — (D)BNDG ADHESIVE FABRIC 3/4X3 -- DISC BY MFR USE ITEM 357960

## (undated) DEVICE — TRAY MYEL SFTY +

## (undated) DEVICE — MEDIA CONTRAST 10ML 200MG/ML 41%

## (undated) DEVICE — (D)SYR 10ML 1/5ML GRAD NSAF -- PKGING CHANGE USE ITEM 338027